# Patient Record
Sex: FEMALE | Race: WHITE | Employment: OTHER | URBAN - METROPOLITAN AREA
[De-identification: names, ages, dates, MRNs, and addresses within clinical notes are randomized per-mention and may not be internally consistent; named-entity substitution may affect disease eponyms.]

---

## 2018-03-02 PROBLEM — J44.9 COPD (CHRONIC OBSTRUCTIVE PULMONARY DISEASE) (HCC): Status: ACTIVE | Noted: 2018-03-02

## 2018-03-02 PROBLEM — J96.01 ACUTE HYPOXEMIC RESPIRATORY FAILURE (HCC): Status: ACTIVE | Noted: 2018-03-02

## 2018-03-02 PROBLEM — Z99.89 OSA ON CPAP: Status: ACTIVE | Noted: 2018-03-02

## 2018-03-02 PROBLEM — J44.1 COPD EXACERBATION (HCC): Status: ACTIVE | Noted: 2018-03-02

## 2018-03-02 PROBLEM — G47.33 OSA ON CPAP: Status: ACTIVE | Noted: 2018-03-02

## 2018-03-12 ENCOUNTER — TELEPHONE (OUTPATIENT)
Dept: PULMONOLOGY | Age: 53
End: 2018-03-12

## 2018-03-13 PROBLEM — J96.01 ACUTE HYPOXEMIC RESPIRATORY FAILURE (HCC): Status: RESOLVED | Noted: 2018-03-02 | Resolved: 2018-03-13

## 2018-03-15 ENCOUNTER — TELEPHONE (OUTPATIENT)
Dept: PULMONOLOGY | Age: 53
End: 2018-03-15

## 2018-03-15 DIAGNOSIS — R91.1 PULMONARY NODULE: Primary | ICD-10-CM

## 2018-03-15 NOTE — TELEPHONE ENCOUNTER
Ct scan scheduled for 9/24/18 at 10:30 AM arrival at 10 AM per request.  Patient missed appointment due to transportation issues. Patient is currently scheduled for follow up with  9/25/18. Attempted to reach patient to inform and no answer. Order is pending for Dr. Marissa Dumont to review and sign.

## 2018-03-15 NOTE — TELEPHONE ENCOUNTER
Addendum completed for review and patient has scheduled follow up today 3/15/18. ADDENDUM:   Prior study has become available for review, CT chest from [facility] dated 05/24/2013.  Most of the noncalcified basilar pulmonary   nodules appear similar to prior, though some are slightly increased in    size. For example, 4 mm left lower lobe nodule on series 2, image 25 had    measured   approximately 3 mm on prior.  Others are minimally less conspicuous, for   example a nodule measuring 5 mm on image 24 that had measured 6 mm on    prior.    Chest CT follow-up in 6 months time is suggested to ensure continued   stability of the nodules.       Signed by Nam Underwood MD on 03/15/18 9490

## 2018-03-19 ENCOUNTER — OFFICE VISIT (OUTPATIENT)
Dept: CARDIOLOGY CLINIC | Age: 53
End: 2018-03-19

## 2018-03-19 VITALS
WEIGHT: 293 LBS | SYSTOLIC BLOOD PRESSURE: 114 MMHG | BODY MASS INDEX: 47.09 KG/M2 | DIASTOLIC BLOOD PRESSURE: 82 MMHG | HEART RATE: 81 BPM | OXYGEN SATURATION: 96 % | HEIGHT: 66 IN

## 2018-03-19 DIAGNOSIS — I48.91 ATRIAL FIBRILLATION, UNSPECIFIED TYPE (HCC): Primary | ICD-10-CM

## 2018-03-19 PROCEDURE — G8427 DOCREV CUR MEDS BY ELIG CLIN: HCPCS | Performed by: INTERNAL MEDICINE

## 2018-03-19 PROCEDURE — 1111F DSCHRG MED/CURRENT MED MERGE: CPT | Performed by: INTERNAL MEDICINE

## 2018-03-19 PROCEDURE — G8482 FLU IMMUNIZE ORDER/ADMIN: HCPCS | Performed by: INTERNAL MEDICINE

## 2018-03-19 PROCEDURE — 3017F COLORECTAL CA SCREEN DOC REV: CPT | Performed by: INTERNAL MEDICINE

## 2018-03-19 PROCEDURE — 99214 OFFICE O/P EST MOD 30 MIN: CPT | Performed by: INTERNAL MEDICINE

## 2018-03-19 PROCEDURE — 93000 ELECTROCARDIOGRAM COMPLETE: CPT | Performed by: INTERNAL MEDICINE

## 2018-03-19 PROCEDURE — 4004F PT TOBACCO SCREEN RCVD TLK: CPT | Performed by: INTERNAL MEDICINE

## 2018-03-19 PROCEDURE — G8417 CALC BMI ABV UP PARAM F/U: HCPCS | Performed by: INTERNAL MEDICINE

## 2018-03-19 RX ORDER — AMIODARONE HYDROCHLORIDE 200 MG/1
TABLET ORAL
Qty: 30 TABLET | Refills: 3 | Status: SHIPPED | OUTPATIENT
Start: 2018-03-19 | End: 2018-08-07 | Stop reason: SDUPTHER

## 2018-08-07 RX ORDER — AMIODARONE HYDROCHLORIDE 200 MG/1
TABLET ORAL
Qty: 30 TABLET | Refills: 3 | Status: SHIPPED | OUTPATIENT
Start: 2018-08-07 | End: 2018-12-10 | Stop reason: SDUPTHER

## 2018-09-18 LAB — INR BLD: 2.1

## 2018-09-26 ENCOUNTER — OFFICE VISIT (OUTPATIENT)
Dept: CARDIOLOGY CLINIC | Age: 53
End: 2018-09-26
Payer: MEDICARE

## 2018-09-26 VITALS
WEIGHT: 262 LBS | HEART RATE: 62 BPM | BODY MASS INDEX: 42.11 KG/M2 | HEIGHT: 66 IN | SYSTOLIC BLOOD PRESSURE: 114 MMHG | DIASTOLIC BLOOD PRESSURE: 68 MMHG

## 2018-09-26 DIAGNOSIS — I47.1 ATRIAL TACHYCARDIA (HCC): ICD-10-CM

## 2018-09-26 DIAGNOSIS — Z72.0 TOBACCO ABUSE: ICD-10-CM

## 2018-09-26 DIAGNOSIS — I48.0 PAROXYSMAL ATRIAL FIBRILLATION (HCC): Primary | ICD-10-CM

## 2018-09-26 DIAGNOSIS — I10 ESSENTIAL HYPERTENSION: ICD-10-CM

## 2018-09-26 PROCEDURE — 93000 ELECTROCARDIOGRAM COMPLETE: CPT | Performed by: NURSE PRACTITIONER

## 2018-09-26 PROCEDURE — 4004F PT TOBACCO SCREEN RCVD TLK: CPT | Performed by: NURSE PRACTITIONER

## 2018-09-26 PROCEDURE — 99214 OFFICE O/P EST MOD 30 MIN: CPT | Performed by: NURSE PRACTITIONER

## 2018-09-26 PROCEDURE — G8427 DOCREV CUR MEDS BY ELIG CLIN: HCPCS | Performed by: NURSE PRACTITIONER

## 2018-09-26 PROCEDURE — 3017F COLORECTAL CA SCREEN DOC REV: CPT | Performed by: NURSE PRACTITIONER

## 2018-09-26 PROCEDURE — G8417 CALC BMI ABV UP PARAM F/U: HCPCS | Performed by: NURSE PRACTITIONER

## 2018-09-26 RX ORDER — DULOXETIN HYDROCHLORIDE 60 MG/1
120 CAPSULE, DELAYED RELEASE ORAL
COMMUNITY
Start: 2018-09-25

## 2018-09-26 RX ORDER — OXYCODONE HYDROCHLORIDE 5 MG/1
10 TABLET ORAL 2 TIMES DAILY
COMMUNITY

## 2018-09-26 NOTE — PROGRESS NOTES
MD   torsemide (DEMADEX) 20 MG tablet Take 20 mg by mouth daily   Yes Historical Provider, MD   baclofen (LIORESAL) 10 MG tablet Take 10 mg by mouth 3 times daily   Yes Historical Provider, MD   Calcium Carb-Cholecalciferol (CALCIUM-VITAMIN D) 500-200 MG-UNIT per tablet Take 1 tablet by mouth 2 times daily (with meals)   Yes Historical Provider, MD   clonazePAM (KLONOPIN) 1 MG tablet Take 1 mg by mouth 2 times daily as needed. Yes Historical Provider, MD   diclofenac sodium 1 % GEL Apply 2 g topically 2 times daily   Yes Historical Provider, MD   digoxin (LANOXIN) 125 MCG tablet Take 125 mcg by mouth daily   Yes Historical Provider, MD   docusate sodium (COLACE) 100 MG capsule Take 100 mg by mouth 2 times daily   Yes Historical Provider, MD   fluticasone (FLOVENT HFA) 220 MCG/ACT inhaler Inhale 1 puff into the lungs 2 times daily   Yes Historical Provider, MD   gabapentin (NEURONTIN) 400 MG capsule Take 800 mg by mouth 3 times daily. Yes Historical Provider, MD   Magnesium 400 MG CAPS Take by mouth 2 times daily   Yes Historical Provider, MD   methimazole (TAPAZOLE) 5 MG tablet Take 2.5 mg by mouth nightly 2.5 mg, cuts the 5 mg in half   Yes Historical Provider, MD   nicotine (NICODERM CQ) 7 MG/24HR Place 1 patch onto the skin every 24 hours   Yes Historical Provider, MD   potassium chloride (KLOR-CON) 10 MEQ extended release tablet Take 40 mEq by mouth daily    Yes Historical Provider, MD   acetaminophen (TYLENOL) 325 MG tablet Take 650 mg by mouth every 6 hours as needed for Pain   Yes Historical Provider, MD   warfarin (COUMADIN) 7.5 MG tablet Take 7.5 mg by mouth   Yes Historical Provider, MD   vitamin D (ERGOCALCIFEROL) 72772 units CAPS capsule Take 50,000 Units by mouth once a week   Yes Historical Provider, MD   albuterol (PROVENTIL HFA;VENTOLIN HFA) 108 (90 BASE) MCG/ACT inhaler Inhale 2 puffs into the lungs every 6 hours as needed.    Yes Historical Provider, MD       Allergies:  Statins and Morphine    Social History:   reports that she has been smoking Cigarettes. She has a 35.00 pack-year smoking history. She has never used smokeless tobacco. She reports that she does not drink alcohol. Family History: family history includes Heart Disease in her father and mother. Review of Systems   Constitutional: Negative. HENT: Negative. Eyes: Negative. Respiratory: + chronic SOB  Cardiovascular: see HPI  Gastrointestinal: Negative. Genitourinary: Negative. Musculoskeletal: + right hip and back pain; chronic right leg swelling; uses wheelchair  Skin: Negative. Neurological: Negative. Hematological: Negative. Psychiatric/Behavioral: Negative. PHYSICAL EXAM:    Physical Examination:    /68   Pulse 62   Ht 5' 6\" (1.676 m)   Wt 262 lb (118.8 kg)   BMI 42.29 kg/m²      Constitutional and general appearance: alert, cooperative, no distress and appears stated age  [de-identified]: PERRL, no cervical lymphadenopathy. No masses palpable. Normal oral mucosa  Respiratory:  · Normal excursion and expansion without use of accessory muscles  · Resp auscultation: Diminished breath sounds; no wheezing/rhonchi/rales  Cardiovascular:  · The apical impulse is not displaced  · Heart tones are crisp and normal. Regular S1 and S2.  · Jugular venous pulsation Normal  · The carotid upstroke is normal in amplitude and contour without delay or bruit  · Peripheral pulses are symmetrical and full   Abdomen:  · No masses or tenderness  · Bowel sounds present  Extremities:  ·  No cyanosis or clubbing  ·  Mild swelling of the right leg (chronic per patient)  ·  Skin: warm and dry  Neurological:  · Alert and oriented  · Moves all extremities well  · Anxious appearing    DATA:    ECG 9/26/2018:  SR HR 61    Echo 3/3/2018:  Technically difficult study.  Normal left ventricular size, wall thickness and wall motion with an estimated ejection fraction of 50-55 %.   Left atrium is moderately dilated.   Mild to moderate mitral regurgitation is present. CARDIOLOGY LABS:   CBC: No results for input(s): WBC, HGB, HCT, PLT in the last 72 hours. BMP: No results for input(s): NA, K, CO2, BUN, CREATININE, LABGLOM, GLUCOSE in the last 72 hours. PT/INR: No results for input(s): PROTIME, INR in the last 72 hours. APTT:No results for input(s): APTT in the last 72 hours. FASTING LIPID PANEL:No results found for: HDL, LDLDIRECT, LDLCALC, TRIG  LIVER PROFILE:No results for input(s): AST, ALT, ALB in the last 72 hours. Assessment:   1. Symptomatic paroxysmal (formerly persistent) atrial arrhythmias   -history of afib and AT   -s/p failed afib ablation in 2014 at [facility]   -intolerant of sotalol due to wheezing   -amiodarone started in 3/2018  2. HTN: controlled  3. History of NICM: EF improved to 40-45%  4. Mitral regurgitation: mild to moderate on echo in 3/2018  5. Chronic pain: followed by pain clinic at   6. Hyperthyroidism: on Tapazole  7. LOTTIE  8. COPD  9. Tobacco abuse    Plan:   1. Continue amiodarone, digoxin, and Coumadin  2. Refer to endocrinology due to hyperthyroidism and amiodarone use  3. Patient checks INR at home and is requesting for our office to manage. Will arrange. 4. Smoking cessation is recommended  5.  Follow up in 3 months     DEE Arias-CNP  [facility]  [phone]

## 2018-10-01 ENCOUNTER — TELEPHONE (OUTPATIENT)
Dept: PULMONOLOGY | Age: 53
End: 2018-10-01

## 2018-10-04 ENCOUNTER — TELEPHONE (OUTPATIENT)
Dept: CARDIOLOGY CLINIC | Age: 53
End: 2018-10-04

## 2018-10-08 ENCOUNTER — ANTI-COAG VISIT (OUTPATIENT)
Dept: CARDIOLOGY CLINIC | Age: 53
End: 2018-10-08

## 2018-10-08 NOTE — TELEPHONE ENCOUNTER
INR was 2.1 3 weeks ago. Pt takes 2.5 mg 5 days per week and 1.25 the other 2 days of the week. Please call to advise.

## 2018-10-10 ENCOUNTER — TELEPHONE (OUTPATIENT)
Dept: CARDIOLOGY CLINIC | Age: 53
End: 2018-10-10

## 2018-10-10 LAB — INR BLD: 4.1

## 2018-10-11 ENCOUNTER — ANTI-COAG VISIT (OUTPATIENT)
Dept: CARDIOLOGY CLINIC | Age: 53
End: 2018-10-11

## 2018-10-11 NOTE — TELEPHONE ENCOUNTER
LMOVM. INR is 4.1. Per protocol, dosing change is hold today. New regimen is 1.25 Mon/Thu/Sat and 2.5 mg all other days. Recheck 1 week(s).  Provider notified. ~ Janene Doherty RN

## 2018-10-22 ENCOUNTER — ANTI-COAG VISIT (OUTPATIENT)
Dept: CARDIOLOGY CLINIC | Age: 53
End: 2018-10-22

## 2018-10-22 ENCOUNTER — TELEPHONE (OUTPATIENT)
Dept: CARDIOLOGY CLINIC | Age: 53
End: 2018-10-22

## 2018-10-22 LAB — INR BLD: 2.5

## 2018-10-28 ENCOUNTER — HOSPITAL ENCOUNTER (OUTPATIENT)
Dept: CT IMAGING | Age: 53
Discharge: HOME | End: 2018-10-28
Payer: MEDICARE

## 2018-10-28 DIAGNOSIS — R91.1 PULMONARY NODULE: ICD-10-CM

## 2018-10-28 PROCEDURE — 71250 CT THORAX DX C-: CPT

## 2018-10-29 ENCOUNTER — TELEPHONE (OUTPATIENT)
Dept: CARDIOLOGY CLINIC | Age: 53
End: 2018-10-29

## 2018-10-29 LAB — INR BLD: 2.5

## 2018-10-31 ENCOUNTER — ANTI-COAG VISIT (OUTPATIENT)
Dept: CARDIOLOGY CLINIC | Age: 53
End: 2018-10-31

## 2018-10-31 ENCOUNTER — OFFICE VISIT (OUTPATIENT)
Dept: PULMONOLOGY | Age: 53
End: 2018-10-31
Payer: MEDICARE

## 2018-10-31 ENCOUNTER — TELEPHONE (OUTPATIENT)
Dept: PULMONOLOGY | Age: 53
End: 2018-10-31

## 2018-10-31 VITALS
HEIGHT: 67 IN | WEIGHT: 248 LBS | BODY MASS INDEX: 38.92 KG/M2 | OXYGEN SATURATION: 94 % | SYSTOLIC BLOOD PRESSURE: 122 MMHG | TEMPERATURE: 98.5 F | DIASTOLIC BLOOD PRESSURE: 79 MMHG | RESPIRATION RATE: 20 BRPM | HEART RATE: 122 BPM

## 2018-10-31 DIAGNOSIS — R91.1 PULMONARY NODULE: ICD-10-CM

## 2018-10-31 DIAGNOSIS — E04.1 THYROID NODULE: Primary | ICD-10-CM

## 2018-10-31 DIAGNOSIS — R59.0 MEDIASTINAL LYMPHADENOPATHY: ICD-10-CM

## 2018-10-31 DIAGNOSIS — R91.1 PULMONARY NODULE: Primary | ICD-10-CM

## 2018-10-31 DIAGNOSIS — D49.0 SALIVARY GLAND TUMOR: ICD-10-CM

## 2018-10-31 PROCEDURE — G8484 FLU IMMUNIZE NO ADMIN: HCPCS | Performed by: INTERNAL MEDICINE

## 2018-10-31 PROCEDURE — 99214 OFFICE O/P EST MOD 30 MIN: CPT | Performed by: INTERNAL MEDICINE

## 2018-10-31 PROCEDURE — G8417 CALC BMI ABV UP PARAM F/U: HCPCS | Performed by: INTERNAL MEDICINE

## 2018-10-31 PROCEDURE — 3017F COLORECTAL CA SCREEN DOC REV: CPT | Performed by: INTERNAL MEDICINE

## 2018-10-31 PROCEDURE — G8427 DOCREV CUR MEDS BY ELIG CLIN: HCPCS | Performed by: INTERNAL MEDICINE

## 2018-10-31 PROCEDURE — 4004F PT TOBACCO SCREEN RCVD TLK: CPT | Performed by: INTERNAL MEDICINE

## 2018-10-31 RX ORDER — FLUTICASONE FUROATE AND VILANTEROL 200; 25 UG/1; UG/1
1 POWDER RESPIRATORY (INHALATION) DAILY
Qty: 1 EACH | Refills: 5 | Status: SHIPPED | OUTPATIENT
Start: 2018-10-31 | End: 2019-04-02 | Stop reason: SDUPTHER

## 2018-10-31 NOTE — PROGRESS NOTES
involve the thoracic spine.           Impression   1. Multiple solid bilateral pulmonary nodules.  The differential diagnosis   favors metastatic disease rather than an infectious/inflammatory process. Recommend PET-CT for further evaluation. 2. Mildly enlarged mediastinal adenopathy. 3. 2.6 cm left thyroid nodule.  Recommend nonemergent thyroid ultrasound   further evaluation.      Assessment:  · Pulmonary Nodules, remote h/o Pulmonary Nodules at [facility] but size listed as 6 mm and smaller, no Mediastinal lymphadenopathy   · Mediastinal lymphadenopathy   · Left thyroid nodule  · Asthma, moderate persistent  · Recent hospitalization with sotalol causing increased wheezing  · LOTTIE on home CPAP  · Pulmonary hypertension seen in past by Dr. Cindy Hogan, felt to be 2/2 CHF  · Morbid obesity  · Atrial fibrillation     Plan:   · CT PET  · Ultrasound evaluation of left thyroid nodule  · Breo 200, PRN albuterol  · Tobacco cessation

## 2018-10-31 NOTE — TELEPHONE ENCOUNTER
Patient is scheduled for a PET/CT 11/6/18 at 12 PM [facility]. Please look for results.

## 2018-11-02 ENCOUNTER — HOSPITAL ENCOUNTER (OUTPATIENT)
Dept: ULTRASOUND IMAGING | Age: 53
Discharge: HOME | End: 2018-11-02
Payer: MEDICARE

## 2018-11-02 DIAGNOSIS — E04.1 THYROID NODULE: ICD-10-CM

## 2018-11-02 PROCEDURE — 76536 US EXAM OF HEAD AND NECK: CPT

## 2018-11-05 LAB — INR BLD: 2.4

## 2018-11-06 ENCOUNTER — ANTI-COAG VISIT (OUTPATIENT)
Dept: CARDIOLOGY CLINIC | Age: 53
End: 2018-11-06

## 2018-11-06 ENCOUNTER — TELEPHONE (OUTPATIENT)
Dept: CARDIOLOGY CLINIC | Age: 53
End: 2018-11-06

## 2018-11-06 ENCOUNTER — HOSPITAL ENCOUNTER (OUTPATIENT)
Dept: PET IMAGING | Age: 53
Discharge: HOME | End: 2018-11-06
Payer: MEDICARE

## 2018-11-06 VITALS — WEIGHT: 248 LBS | BODY MASS INDEX: 38.92 KG/M2 | HEIGHT: 67 IN

## 2018-11-06 DIAGNOSIS — R91.1 PULMONARY NODULE: ICD-10-CM

## 2018-11-06 PROCEDURE — A9552 F18 FDG: HCPCS | Performed by: INTERNAL MEDICINE

## 2018-11-06 PROCEDURE — 3430000000 HC RX DIAGNOSTIC RADIOPHARMACEUTICAL: Performed by: INTERNAL MEDICINE

## 2018-11-06 PROCEDURE — 78815 PET IMAGE W/CT SKULL-THIGH: CPT

## 2018-11-06 RX ORDER — FLUDEOXYGLUCOSE F 18 200 MCI/ML
15.61 INJECTION, SOLUTION INTRAVENOUS
Status: COMPLETED | OUTPATIENT
Start: 2018-11-06 | End: 2018-11-06

## 2018-11-06 RX ADMIN — FLUDEOXYGLUCOSE F 18 15.61 MILLICURIE: 200 INJECTION, SOLUTION INTRAVENOUS at 12:00

## 2018-11-06 NOTE — PROGRESS NOTES
11/06/18: INR is 2.4. Per protocol, no changes. Continue 1.25 mg Mon/Thu/Sat and 2.5 mg all other days. Recheck 1 week(s).  Provider notified. ~ Luana Redding RN

## 2018-11-08 ENCOUNTER — OFFICE VISIT (OUTPATIENT)
Dept: PULMONOLOGY | Age: 53
End: 2018-11-08
Payer: MEDICARE

## 2018-11-08 VITALS
RESPIRATION RATE: 16 BRPM | DIASTOLIC BLOOD PRESSURE: 86 MMHG | BODY MASS INDEX: 39.71 KG/M2 | TEMPERATURE: 97.6 F | SYSTOLIC BLOOD PRESSURE: 130 MMHG | WEIGHT: 253 LBS | HEART RATE: 95 BPM | HEIGHT: 67 IN | OXYGEN SATURATION: 95 %

## 2018-11-08 DIAGNOSIS — R59.0 MEDIASTINAL LYMPHADENOPATHY: ICD-10-CM

## 2018-11-08 DIAGNOSIS — R91.1 PULMONARY NODULE: Primary | ICD-10-CM

## 2018-11-08 DIAGNOSIS — K11.8 PAROTID NODULE: ICD-10-CM

## 2018-11-08 PROCEDURE — G8417 CALC BMI ABV UP PARAM F/U: HCPCS | Performed by: INTERNAL MEDICINE

## 2018-11-08 PROCEDURE — G8484 FLU IMMUNIZE NO ADMIN: HCPCS | Performed by: INTERNAL MEDICINE

## 2018-11-08 PROCEDURE — 3017F COLORECTAL CA SCREEN DOC REV: CPT | Performed by: INTERNAL MEDICINE

## 2018-11-08 PROCEDURE — G8427 DOCREV CUR MEDS BY ELIG CLIN: HCPCS | Performed by: INTERNAL MEDICINE

## 2018-11-08 PROCEDURE — 4004F PT TOBACCO SCREEN RCVD TLK: CPT | Performed by: INTERNAL MEDICINE

## 2018-11-08 PROCEDURE — 99214 OFFICE O/P EST MOD 30 MIN: CPT | Performed by: INTERNAL MEDICINE

## 2018-11-08 ASSESSMENT — SLEEP AND FATIGUE QUESTIONNAIRES
HOW LIKELY ARE YOU TO NOD OFF OR FALL ASLEEP WHILE SITTING AND READING: 3
NECK CIRCUMFERENCE (INCHES): 16.75
HOW LIKELY ARE YOU TO NOD OFF OR FALL ASLEEP WHILE SITTING QUIETLY AFTER LUNCH WITHOUT ALCOHOL: 0
HOW LIKELY ARE YOU TO NOD OFF OR FALL ASLEEP WHILE WATCHING TV: 2
HOW LIKELY ARE YOU TO NOD OFF OR FALL ASLEEP WHEN YOU ARE A PASSENGER IN A CAR FOR AN HOUR WITHOUT A BREAK: 0
HOW LIKELY ARE YOU TO NOD OFF OR FALL ASLEEP WHILE LYING DOWN TO REST IN THE AFTERNOON WHEN CIRCUMSTANCES PERMIT: 3
HOW LIKELY ARE YOU TO NOD OFF OR FALL ASLEEP WHILE SITTING AND TALKING TO SOMEONE: 0
HOW LIKELY ARE YOU TO NOD OFF OR FALL ASLEEP WHILE SITTING INACTIVE IN A PUBLIC PLACE: 0
HOW LIKELY ARE YOU TO NOD OFF OR FALL ASLEEP IN A CAR, WHILE STOPPED FOR A FEW MINUTES IN TRAFFIC: 0
ESS TOTAL SCORE: 8

## 2018-11-08 NOTE — PATIENT INSTRUCTIONS
Bronchoscopy with endobronchial ultrasound (EBUS) guided biopsy to biopsy lymph nodes   HOLD coumadin after Sunday night for Friday procedure  ENT to evaluate the parotid gland    Tips to Help You Stop Smoking (taken from Up-To-Date)      Cigarette smoking is a preventable cause of death in the [place]. Quitting smoking now can decrease your risk of getting smoking-related illnesses like heart disease, stroke, cancer & COPD. S = Set a quit date. T = Tell family, friends, and the people around you that you plan to quit. A = Anticipate or plan ahead for the tough times you'll face while quitting. R = Remove cigarettes and other tobacco products from your home, car, and work. T = Talk to your doctor about getting help to quit. Your doctor may give you medicines to reduce your craving for cigarettes & the unpleasant symptoms that happen when you stop smoking (called withdrawal symptoms). What are the symptoms of withdrawal? -- The symptoms include:   ?Trouble sleeping   ? Being irritable, anxious or restless   ? Getting frustrated or angry   ? Having trouble thinking clearly  Nicotine replacement therapy eases withdrawal and reduces your bodys craving for nicotine, the main drug found in cigarettes. Non-prescription forms of nicotine replacement include skin patches, lozenges, and gum. Two prescription medications are available that have been proven to help people stop smoking:  ? Bupropion is a prescription medicine that reduces your desire to smoke. This medicine is sold under the brand names Zyban and Wellbutrin & as a generic formulation. ? Varenicline (brand name: Chantix) is a prescription medicine that reduces withdrawal symptoms and cigarette cravings. If you take bupropion or varenicline and you have any new or worsening depressed mood or thoughts of harming yourself or someone else, stop taking the medicine and call your doctor.   Buproprion should not be taken by anyone with a history of

## 2018-11-08 NOTE — TELEPHONE ENCOUNTER
Need to scheduled bronch for 11/16/18 at [address] [place] [address], will need to contact endoscopy tomorrow.     Assessment:OV 11/8/18  · Left parotid gland nodule, hypermetabolic   · Pulmonary Nodules, remote h/o Pulmonary Nodules at [facility] but size listed as 6 mm and smaller, no Mediastinal lymphadenopathy   · Mediastinal lymphadenopathy   · Left thyroid nodule  · Asthma, moderate persistent  · Recent hospitalization with sotalol causing increased wheezing  · LOTTIE on home CPAP  · Pulmonary hypertension seen in past by Dr. Freya Mejia, felt to be 2/2 CHF  · Morbid obesity  · Atrial fibrillation      Plan:   · Referral to ENT for L parotid nodule, hypermetabolic on CT PET  · Bronchoscopy with endobronchial ultrasound (EBUS) guided biopsy Friday am, STAT INR prior to procedure  · Breo 200 (give today), PRN albuterol  · Tobacco cessation

## 2018-11-09 RX ORDER — FLUTICASONE FUROATE AND VILANTEROL 200; 25 UG/1; UG/1
1 POWDER RESPIRATORY (INHALATION) DAILY
Qty: 1 EACH | Refills: 0 | COMMUNITY
Start: 2018-11-09 | End: 2018-11-16

## 2018-11-13 ENCOUNTER — OFFICE VISIT (OUTPATIENT)
Dept: ENT CLINIC | Age: 53
End: 2018-11-13
Payer: MEDICARE

## 2018-11-13 VITALS
DIASTOLIC BLOOD PRESSURE: 82 MMHG | SYSTOLIC BLOOD PRESSURE: 123 MMHG | HEART RATE: 110 BPM | TEMPERATURE: 98.7 F | BODY MASS INDEX: 39.63 KG/M2 | HEIGHT: 67 IN

## 2018-11-13 DIAGNOSIS — K11.8 PAROTID MASS: Primary | ICD-10-CM

## 2018-11-13 PROCEDURE — G8417 CALC BMI ABV UP PARAM F/U: HCPCS | Performed by: OTOLARYNGOLOGY

## 2018-11-13 PROCEDURE — 4004F PT TOBACCO SCREEN RCVD TLK: CPT | Performed by: OTOLARYNGOLOGY

## 2018-11-13 PROCEDURE — 99203 OFFICE O/P NEW LOW 30 MIN: CPT | Performed by: OTOLARYNGOLOGY

## 2018-11-13 PROCEDURE — 3017F COLORECTAL CA SCREEN DOC REV: CPT | Performed by: OTOLARYNGOLOGY

## 2018-11-13 PROCEDURE — G8427 DOCREV CUR MEDS BY ELIG CLIN: HCPCS | Performed by: OTOLARYNGOLOGY

## 2018-11-13 PROCEDURE — G8484 FLU IMMUNIZE NO ADMIN: HCPCS | Performed by: OTOLARYNGOLOGY

## 2018-11-15 ENCOUNTER — ANESTHESIA EVENT (OUTPATIENT)
Dept: ENDOSCOPY | Age: 53
End: 2018-11-15
Payer: MEDICARE

## 2018-11-15 ASSESSMENT — LIFESTYLE VARIABLES: SMOKING_STATUS: 1

## 2018-11-16 ENCOUNTER — HOSPITAL ENCOUNTER (OUTPATIENT)
Age: 53
Setting detail: OUTPATIENT SURGERY
Discharge: HOME | End: 2018-11-16
Attending: INTERNAL MEDICINE | Admitting: INTERNAL MEDICINE
Payer: MEDICARE

## 2018-11-16 ENCOUNTER — ANESTHESIA (OUTPATIENT)
Dept: ENDOSCOPY | Age: 53
End: 2018-11-16
Payer: MEDICARE

## 2018-11-16 VITALS
SYSTOLIC BLOOD PRESSURE: 118 MMHG | DIASTOLIC BLOOD PRESSURE: 81 MMHG | TEMPERATURE: 97.6 F | RESPIRATION RATE: 17 BRPM | OXYGEN SATURATION: 91 % | HEART RATE: 105 BPM

## 2018-11-16 VITALS
DIASTOLIC BLOOD PRESSURE: 84 MMHG | RESPIRATION RATE: 4 BRPM | OXYGEN SATURATION: 98 % | SYSTOLIC BLOOD PRESSURE: 113 MMHG

## 2018-11-16 LAB
APPEARANCE BAL (LAVAGE): ABNORMAL
CLOT EVALUATION BAL: ABNORMAL
COLOR LAVAGE: ABNORMAL
EOSIN: 1 %
INR BLD: 1.13 (ref 0.86–1.14)
LYMPHOCYTES, BAL: 12 % (ref 5–10)
MACROPHAGES, BAL: 70 % (ref 90–95)
NUMBER OF CELLS COUNTED BAL (LAVAGE): 200
PLATELET # BLD: 281 K/UL (ref 135–450)
PROTHROMBIN TIME: 12.9 SEC (ref 9.8–13)
RBC, BAL: ABNORMAL /CUMM
SEGMENTED NEUTROPHILS, BAL: 17 % (ref 5–10)
WBC/EPI CELLS BAL: 167 /CUMM

## 2018-11-16 PROCEDURE — 3700000001 HC ADD 15 MINUTES (ANESTHESIA): Performed by: INTERNAL MEDICINE

## 2018-11-16 PROCEDURE — 3609010800 HC BRONCHOSCOPY ALVEOLAR LAVAGE: Performed by: INTERNAL MEDICINE

## 2018-11-16 PROCEDURE — 3609011200 HC BRONCHOSCOPY W/TRANSBRONCL NDL ASPIR BX EA ADDL LOBE: Performed by: INTERNAL MEDICINE

## 2018-11-16 PROCEDURE — 36415 COLL VENOUS BLD VENIPUNCTURE: CPT

## 2018-11-16 PROCEDURE — 88112 CYTOPATH CELL ENHANCE TECH: CPT

## 2018-11-16 PROCEDURE — 88173 CYTOPATH EVAL FNA REPORT: CPT

## 2018-11-16 PROCEDURE — 87116 MYCOBACTERIA CULTURE: CPT

## 2018-11-16 PROCEDURE — 85049 AUTOMATED PLATELET COUNT: CPT

## 2018-11-16 PROCEDURE — 88185 FLOWCYTOMETRY/TC ADD-ON: CPT

## 2018-11-16 PROCEDURE — 87070 CULTURE OTHR SPECIMN AEROBIC: CPT

## 2018-11-16 PROCEDURE — 31653 BRONCH EBUS SAMPLNG 3/> NODE: CPT | Performed by: INTERNAL MEDICINE

## 2018-11-16 PROCEDURE — 3609011900 HC BRONCHOSCOPY NEEDLE BX TRACHEA MAIN STEM&/BRON: Performed by: INTERNAL MEDICINE

## 2018-11-16 PROCEDURE — 87102 FUNGUS ISOLATION CULTURE: CPT

## 2018-11-16 PROCEDURE — 31624 DX BRONCHOSCOPE/LAVAGE: CPT | Performed by: INTERNAL MEDICINE

## 2018-11-16 PROCEDURE — 88177 CYTP FNA EVAL EA ADDL: CPT

## 2018-11-16 PROCEDURE — 88305 TISSUE EXAM BY PATHOLOGIST: CPT

## 2018-11-16 PROCEDURE — 2709999900 HC NON-CHARGEABLE SUPPLY: Performed by: INTERNAL MEDICINE

## 2018-11-16 PROCEDURE — 2580000003 HC RX 258: Performed by: NURSE ANESTHETIST, CERTIFIED REGISTERED

## 2018-11-16 PROCEDURE — 87205 SMEAR GRAM STAIN: CPT

## 2018-11-16 PROCEDURE — 3700000000 HC ANESTHESIA ATTENDED CARE: Performed by: INTERNAL MEDICINE

## 2018-11-16 PROCEDURE — 88172 CYTP DX EVAL FNA 1ST EA SITE: CPT

## 2018-11-16 PROCEDURE — 87015 SPECIMEN INFECT AGNT CONCNTJ: CPT

## 2018-11-16 PROCEDURE — 88184 FLOWCYTOMETRY/ TC 1 MARKER: CPT

## 2018-11-16 PROCEDURE — 3603165200 HC BRNCHSC EBUS GUIDED SAMPL 1/2 NODE STATION/STRUX: Performed by: INTERNAL MEDICINE

## 2018-11-16 PROCEDURE — 6360000002 HC RX W HCPCS: Performed by: NURSE ANESTHETIST, CERTIFIED REGISTERED

## 2018-11-16 PROCEDURE — 89051 BODY FLUID CELL COUNT: CPT

## 2018-11-16 PROCEDURE — 2500000003 HC RX 250 WO HCPCS: Performed by: NURSE ANESTHETIST, CERTIFIED REGISTERED

## 2018-11-16 PROCEDURE — 87206 SMEAR FLUORESCENT/ACID STAI: CPT

## 2018-11-16 PROCEDURE — 7100000010 HC PHASE II RECOVERY - FIRST 15 MIN: Performed by: INTERNAL MEDICINE

## 2018-11-16 PROCEDURE — 85610 PROTHROMBIN TIME: CPT

## 2018-11-16 PROCEDURE — 7100000011 HC PHASE II RECOVERY - ADDTL 15 MIN: Performed by: INTERNAL MEDICINE

## 2018-11-16 RX ORDER — OXYCODONE HYDROCHLORIDE AND ACETAMINOPHEN 5; 325 MG/1; MG/1
1 TABLET ORAL PRN
Status: DISCONTINUED | OUTPATIENT
Start: 2018-11-16 | End: 2018-11-16 | Stop reason: HOSPADM

## 2018-11-16 RX ORDER — ONDANSETRON 2 MG/ML
INJECTION INTRAMUSCULAR; INTRAVENOUS PRN
Status: DISCONTINUED | OUTPATIENT
Start: 2018-11-16 | End: 2018-11-16 | Stop reason: SDUPTHER

## 2018-11-16 RX ORDER — LABETALOL HYDROCHLORIDE 5 MG/ML
5 INJECTION, SOLUTION INTRAVENOUS EVERY 10 MIN PRN
Status: DISCONTINUED | OUTPATIENT
Start: 2018-11-16 | End: 2018-11-16 | Stop reason: HOSPADM

## 2018-11-16 RX ORDER — ROCURONIUM BROMIDE 10 MG/ML
INJECTION, SOLUTION INTRAVENOUS PRN
Status: DISCONTINUED | OUTPATIENT
Start: 2018-11-16 | End: 2018-11-16 | Stop reason: SDUPTHER

## 2018-11-16 RX ORDER — HYDROMORPHONE HCL 110MG/55ML
0.25 PATIENT CONTROLLED ANALGESIA SYRINGE INTRAVENOUS EVERY 5 MIN PRN
Status: DISCONTINUED | OUTPATIENT
Start: 2018-11-16 | End: 2018-11-16 | Stop reason: HOSPADM

## 2018-11-16 RX ORDER — HYDRALAZINE HYDROCHLORIDE 20 MG/ML
5 INJECTION INTRAMUSCULAR; INTRAVENOUS EVERY 10 MIN PRN
Status: DISCONTINUED | OUTPATIENT
Start: 2018-11-16 | End: 2018-11-16 | Stop reason: HOSPADM

## 2018-11-16 RX ORDER — OXYCODONE HYDROCHLORIDE AND ACETAMINOPHEN 5; 325 MG/1; MG/1
2 TABLET ORAL PRN
Status: DISCONTINUED | OUTPATIENT
Start: 2018-11-16 | End: 2018-11-16 | Stop reason: HOSPADM

## 2018-11-16 RX ORDER — HYDROMORPHONE HCL 110MG/55ML
0.5 PATIENT CONTROLLED ANALGESIA SYRINGE INTRAVENOUS EVERY 5 MIN PRN
Status: DISCONTINUED | OUTPATIENT
Start: 2018-11-16 | End: 2018-11-16 | Stop reason: HOSPADM

## 2018-11-16 RX ORDER — FENTANYL CITRATE 50 UG/ML
INJECTION, SOLUTION INTRAMUSCULAR; INTRAVENOUS PRN
Status: DISCONTINUED | OUTPATIENT
Start: 2018-11-16 | End: 2018-11-16 | Stop reason: SDUPTHER

## 2018-11-16 RX ORDER — DEXAMETHASONE SODIUM PHOSPHATE 4 MG/ML
INJECTION, SOLUTION INTRA-ARTICULAR; INTRALESIONAL; INTRAMUSCULAR; INTRAVENOUS; SOFT TISSUE PRN
Status: DISCONTINUED | OUTPATIENT
Start: 2018-11-16 | End: 2018-11-16 | Stop reason: SDUPTHER

## 2018-11-16 RX ORDER — ONDANSETRON 2 MG/ML
4 INJECTION INTRAMUSCULAR; INTRAVENOUS EVERY 10 MIN PRN
Status: DISCONTINUED | OUTPATIENT
Start: 2018-11-16 | End: 2018-11-16 | Stop reason: HOSPADM

## 2018-11-16 RX ORDER — MEPERIDINE HYDROCHLORIDE 25 MG/ML
12.5 INJECTION INTRAMUSCULAR; INTRAVENOUS; SUBCUTANEOUS EVERY 5 MIN PRN
Status: DISCONTINUED | OUTPATIENT
Start: 2018-11-16 | End: 2018-11-16 | Stop reason: HOSPADM

## 2018-11-16 RX ORDER — MIDAZOLAM HYDROCHLORIDE 1 MG/ML
INJECTION INTRAMUSCULAR; INTRAVENOUS PRN
Status: DISCONTINUED | OUTPATIENT
Start: 2018-11-16 | End: 2018-11-16 | Stop reason: SDUPTHER

## 2018-11-16 RX ORDER — KETOROLAC TROMETHAMINE 30 MG/ML
INJECTION, SOLUTION INTRAMUSCULAR; INTRAVENOUS PRN
Status: DISCONTINUED | OUTPATIENT
Start: 2018-11-16 | End: 2018-11-16 | Stop reason: SDUPTHER

## 2018-11-16 RX ORDER — PROPOFOL 10 MG/ML
INJECTION, EMULSION INTRAVENOUS PRN
Status: DISCONTINUED | OUTPATIENT
Start: 2018-11-16 | End: 2018-11-16 | Stop reason: SDUPTHER

## 2018-11-16 RX ORDER — SODIUM CHLORIDE, SODIUM LACTATE, POTASSIUM CHLORIDE, CALCIUM CHLORIDE 600; 310; 30; 20 MG/100ML; MG/100ML; MG/100ML; MG/100ML
INJECTION, SOLUTION INTRAVENOUS CONTINUOUS PRN
Status: DISCONTINUED | OUTPATIENT
Start: 2018-11-16 | End: 2018-11-16 | Stop reason: SDUPTHER

## 2018-11-16 RX ORDER — LIDOCAINE HYDROCHLORIDE 20 MG/ML
INJECTION, SOLUTION INFILTRATION; PERINEURAL PRN
Status: DISCONTINUED | OUTPATIENT
Start: 2018-11-16 | End: 2018-11-16 | Stop reason: SDUPTHER

## 2018-11-16 RX ADMIN — DEXAMETHASONE SODIUM PHOSPHATE 10 MG: 4 INJECTION, SOLUTION INTRAMUSCULAR; INTRAVENOUS at 07:25

## 2018-11-16 RX ADMIN — KETOROLAC TROMETHAMINE 30 MG: 30 INJECTION, SOLUTION INTRAMUSCULAR; INTRAVENOUS at 08:25

## 2018-11-16 RX ADMIN — ROCURONIUM BROMIDE 10 MG: 10 INJECTION, SOLUTION INTRAVENOUS at 07:33

## 2018-11-16 RX ADMIN — SUGAMMADEX 200 MG: 100 INJECTION, SOLUTION INTRAVENOUS at 08:27

## 2018-11-16 RX ADMIN — ROCURONIUM BROMIDE 10 MG: 10 INJECTION, SOLUTION INTRAVENOUS at 07:56

## 2018-11-16 RX ADMIN — ONDANSETRON 4 MG: 2 INJECTION, SOLUTION INTRAMUSCULAR; INTRAVENOUS at 07:30

## 2018-11-16 RX ADMIN — LIDOCAINE HYDROCHLORIDE 50 MG: 20 INJECTION, SOLUTION INFILTRATION; PERINEURAL at 07:19

## 2018-11-16 RX ADMIN — MIDAZOLAM 2 MG: 1 INJECTION INTRAMUSCULAR; INTRAVENOUS at 07:15

## 2018-11-16 RX ADMIN — FENTANYL CITRATE 50 MCG: 50 INJECTION INTRAMUSCULAR; INTRAVENOUS at 07:19

## 2018-11-16 RX ADMIN — PROPOFOL 200 MG: 10 INJECTION, EMULSION INTRAVENOUS at 07:19

## 2018-11-16 RX ADMIN — ROCURONIUM BROMIDE 50 MG: 10 INJECTION, SOLUTION INTRAVENOUS at 07:15

## 2018-11-16 RX ADMIN — SODIUM CHLORIDE, POTASSIUM CHLORIDE, SODIUM LACTATE AND CALCIUM CHLORIDE: 600; 310; 30; 20 INJECTION, SOLUTION INTRAVENOUS at 07:15

## 2018-11-16 RX ADMIN — FENTANYL CITRATE 50 MCG: 50 INJECTION INTRAMUSCULAR; INTRAVENOUS at 07:15

## 2018-11-16 ASSESSMENT — PULMONARY FUNCTION TESTS
PIF_VALUE: 12
PIF_VALUE: 30
PIF_VALUE: 35
PIF_VALUE: 35
PIF_VALUE: 28
PIF_VALUE: 34
PIF_VALUE: 35
PIF_VALUE: 28
PIF_VALUE: 35
PIF_VALUE: 1
PIF_VALUE: 35
PIF_VALUE: 1
PIF_VALUE: 35
PIF_VALUE: 29
PIF_VALUE: 33
PIF_VALUE: 35
PIF_VALUE: 35
PIF_VALUE: 21
PIF_VALUE: 29
PIF_VALUE: 35
PIF_VALUE: 35
PIF_VALUE: 33
PIF_VALUE: 35
PIF_VALUE: 4
PIF_VALUE: 8
PIF_VALUE: 29
PIF_VALUE: 1
PIF_VALUE: 35
PIF_VALUE: 1
PIF_VALUE: 35
PIF_VALUE: 2
PIF_VALUE: 35
PIF_VALUE: 29
PIF_VALUE: 11
PIF_VALUE: 35
PIF_VALUE: 35
PIF_VALUE: 5
PIF_VALUE: 1
PIF_VALUE: 10
PIF_VALUE: 35
PIF_VALUE: 4
PIF_VALUE: 2
PIF_VALUE: 28
PIF_VALUE: 35
PIF_VALUE: 8
PIF_VALUE: 1
PIF_VALUE: 35
PIF_VALUE: 12
PIF_VALUE: 1
PIF_VALUE: 1
PIF_VALUE: 28
PIF_VALUE: 24
PIF_VALUE: 35
PIF_VALUE: 1
PIF_VALUE: 35
PIF_VALUE: 36
PIF_VALUE: 16
PIF_VALUE: 35
PIF_VALUE: 28
PIF_VALUE: 35
PIF_VALUE: 35

## 2018-11-16 ASSESSMENT — PAIN DESCRIPTION - ORIENTATION: ORIENTATION: LOWER

## 2018-11-16 ASSESSMENT — PAIN DESCRIPTION - PAIN TYPE: TYPE: CHRONIC PAIN

## 2018-11-16 ASSESSMENT — PAIN SCALES - GENERAL
PAINLEVEL_OUTOF10: 0
PAINLEVEL_OUTOF10: 0
PAINLEVEL_OUTOF10: 4
PAINLEVEL_OUTOF10: 0

## 2018-11-16 ASSESSMENT — PAIN DESCRIPTION - LOCATION: LOCATION: BACK

## 2018-11-16 NOTE — ANESTHESIA POSTPROCEDURE EVALUATION
Department of Anesthesiology  Postprocedure Note    Patient: Lg Hope  MRN: [id number]  YOB: 1965  Date of evaluation: 11/16/2018  Time:  12:16 PM     Procedure Summary     Date:  11/16/18 Room / Location:  [facility] ENDO 03 / [facility]    Anesthesia Start:  0715 Anesthesia Stop:  8563    Procedure:  BRONCHOSCOPY ULTRASOUND WF W/ANES. DRAW STAT INR (N/A ) Diagnosis:  (PULMONARY NODULE)    Surgeon:  Braeden Mckeon MD Responsible Provider:  Jose Wren MD    Anesthesia Type:  general ASA Status:  3          Anesthesia Type: general    Chrissy Phase I: Chrissy Score: 10    Chrissy Phase II: Chrissy Score: 10    Last vitals: Reviewed and per EMR flowsheets.        Anesthesia Post Evaluation    Patient location during evaluation: PACU  Level of consciousness: awake  Airway patency: patent  Nausea & Vomiting: no nausea  Complications: no  Cardiovascular status: blood pressure returned to baseline  Respiratory status: acceptable  Hydration status: euvolemic

## 2018-11-16 NOTE — H&P
MD        meperidine (DEMEROL) injection 12.5 mg  12.5 mg Intravenous Q5 Min PRN Milka Morris MD            reports that she has been smoking Cigarettes. She has a 35.00 pack-year smoking history. She has never used smokeless tobacco.    family history includes Heart Disease in her father and mother. HENT: Airway patent and reviewed. Mallampati 2  Cardiovascular: Normal rate, regular rhythm, normal heart sounds. Pulmonary/Chest: No increased work of breathing. CTA bilaterally  Abdominal: Soft. No distension. ASA CLASS       III. Severe Systemic Disease      Sedation plan: Anesthesia      Post Procedure Plan   Return to same level of care   ______________________    The risks and benefits of fiberoptic bronchoscopy were specifically discussed, including the goal of obtaining a diagnosis, the risks of bleeding, infection, pneumothorax, lung collapse, hospitalization and death. We also discussed the risks of sedation/anesthesia. It is my assessment that the risk of the invasive procedure is outweighed by the benefit. Patient was counseled regarding the recommended procedure, alternatives to the procedure, and possible consequences of not having any evaluation performed.

## 2018-11-18 LAB
CULTURE, RESPIRATORY: NORMAL
CULTURE, RESPIRATORY: NORMAL
GRAM STAIN RESULT: NORMAL
GRAM STAIN RESULT: NORMAL

## 2018-11-26 ENCOUNTER — ANTI-COAG VISIT (OUTPATIENT)
Dept: CARDIOLOGY CLINIC | Age: 53
End: 2018-11-26

## 2018-11-26 DIAGNOSIS — I48.0 PAROXYSMAL ATRIAL FIBRILLATION (HCC): Primary | ICD-10-CM

## 2018-11-26 LAB — INR BLD: 1.4

## 2018-11-26 NOTE — PROGRESS NOTES
11/26/18: INR is 1.4. Per protocol, dosing change is 2.5 today only then resume normal dosing of 1.25 mg Mon/Thu/Sat. Recheck 1 week(s). Provider notified. ~ Art Musa RN. Pt states she had surgery a week ago and had to hold warfarin for 4 days.

## 2018-11-30 NOTE — TELEPHONE ENCOUNTER
MD Jillian Pascal, [address] [facility]             Tell pt CT will include salivary glands.      Change CT to CT CHEST WITH IV DYE, evaluate Mediastinal lymphadenopathy   AND CT head and neck, with IV dye, evaluate salivary gland tumor     Check creatinine prior and proceed if less than 1.6

## 2018-12-03 ENCOUNTER — ANTI-COAG VISIT (OUTPATIENT)
Dept: CARDIOLOGY CLINIC | Age: 53
End: 2018-12-03

## 2018-12-03 ENCOUNTER — TELEPHONE (OUTPATIENT)
Dept: CARDIOLOGY CLINIC | Age: 53
End: 2018-12-03

## 2018-12-03 LAB — INR BLD: 2.3

## 2018-12-03 NOTE — PROGRESS NOTES
12/03/18: INR is 2.3. Per protocol, no changes. Continue 1.25 mg Mon. Thu/ Sat and 2.5 mg all other days. Recheck 1 week(s).  Provider notified. ~ Diana George RN

## 2018-12-10 ENCOUNTER — ANTI-COAG VISIT (OUTPATIENT)
Dept: CARDIOLOGY CLINIC | Age: 53
End: 2018-12-10

## 2018-12-10 ENCOUNTER — TELEPHONE (OUTPATIENT)
Dept: CARDIOLOGY CLINIC | Age: 53
End: 2018-12-10

## 2018-12-10 DIAGNOSIS — I47.1 ATRIAL TACHYCARDIA (HCC): Primary | ICD-10-CM

## 2018-12-10 LAB — INR BLD: 2.3

## 2018-12-11 RX ORDER — AMIODARONE HYDROCHLORIDE 200 MG/1
TABLET ORAL
Qty: 30 TABLET | Refills: 0 | Status: SHIPPED | OUTPATIENT
Start: 2018-12-11 | End: 2019-01-03 | Stop reason: SDUPTHER

## 2018-12-17 LAB
FUNGUS (MYCOLOGY) CULTURE: NORMAL
FUNGUS (MYCOLOGY) CULTURE: NORMAL
FUNGUS STAIN: NORMAL
FUNGUS STAIN: NORMAL
INR BLD: 2

## 2018-12-19 ENCOUNTER — ANTI-COAG VISIT (OUTPATIENT)
Dept: CARDIOLOGY CLINIC | Age: 53
End: 2018-12-19

## 2018-12-19 ENCOUNTER — TELEPHONE (OUTPATIENT)
Dept: CARDIOLOGY CLINIC | Age: 53
End: 2018-12-19

## 2018-12-24 LAB — INR BLD: 1.8

## 2018-12-26 ENCOUNTER — ANTI-COAG VISIT (OUTPATIENT)
Dept: CARDIOLOGY CLINIC | Age: 53
End: 2018-12-26

## 2018-12-26 ENCOUNTER — TELEPHONE (OUTPATIENT)
Dept: CARDIOLOGY CLINIC | Age: 53
End: 2018-12-26

## 2018-12-28 ENCOUNTER — HOSPITAL ENCOUNTER (OUTPATIENT)
Age: 53
Discharge: HOME | End: 2018-12-28
Payer: MEDICARE

## 2018-12-28 DIAGNOSIS — I48.0 PAROXYSMAL ATRIAL FIBRILLATION (HCC): ICD-10-CM

## 2018-12-28 LAB
ALBUMIN SERPL-MCNC: 3.9 G/DL (ref 3.4–5)
ALP BLD-CCNC: 134 U/L (ref 40–129)
ALT SERPL-CCNC: <5 U/L (ref 10–40)
ANION GAP SERPL CALCULATED.3IONS-SCNC: 13 MMOL/L (ref 3–16)
AST SERPL-CCNC: 12 U/L (ref 15–37)
BILIRUB SERPL-MCNC: 0.3 MG/DL (ref 0–1)
BILIRUBIN DIRECT: <0.2 MG/DL (ref 0–0.3)
BILIRUBIN, INDIRECT: ABNORMAL MG/DL (ref 0–1)
BUN BLDV-MCNC: 7 MG/DL (ref 7–20)
CALCIUM SERPL-MCNC: 8.8 MG/DL (ref 8.3–10.6)
CHLORIDE BLD-SCNC: 100 MMOL/L (ref 99–110)
CO2: 26 MMOL/L (ref 21–32)
CREAT SERPL-MCNC: 0.9 MG/DL (ref 0.6–1.1)
GFR AFRICAN AMERICAN: >60
GFR NON-AFRICAN AMERICAN: >60
GLUCOSE BLD-MCNC: 89 MG/DL (ref 70–99)
POTASSIUM SERPL-SCNC: 4.4 MMOL/L (ref 3.5–5.1)
SODIUM BLD-SCNC: 139 MMOL/L (ref 136–145)
TOTAL PROTEIN: 7.3 G/DL (ref 6.4–8.2)
TSH REFLEX: 2.57 UIU/ML (ref 0.27–4.2)

## 2018-12-28 PROCEDURE — 80048 BASIC METABOLIC PNL TOTAL CA: CPT

## 2018-12-28 PROCEDURE — 36415 COLL VENOUS BLD VENIPUNCTURE: CPT

## 2018-12-28 PROCEDURE — 80076 HEPATIC FUNCTION PANEL: CPT

## 2018-12-28 PROCEDURE — 84443 ASSAY THYROID STIM HORMONE: CPT

## 2019-01-01 LAB
AFB CULTURE (MYCOBACTERIA): NORMAL
AFB CULTURE (MYCOBACTERIA): NORMAL
AFB SMEAR: NORMAL
AFB SMEAR: NORMAL

## 2019-01-03 DIAGNOSIS — I47.1 ATRIAL TACHYCARDIA (HCC): ICD-10-CM

## 2019-01-03 RX ORDER — AMIODARONE HYDROCHLORIDE 200 MG/1
TABLET ORAL
Qty: 90 TABLET | Refills: 1 | Status: SHIPPED | OUTPATIENT
Start: 2019-01-03 | End: 2019-06-14 | Stop reason: SDUPTHER

## 2019-01-08 ENCOUNTER — ANTI-COAG VISIT (OUTPATIENT)
Dept: CARDIOLOGY CLINIC | Age: 54
End: 2019-01-08

## 2019-01-08 ENCOUNTER — TELEPHONE (OUTPATIENT)
Dept: CARDIOLOGY CLINIC | Age: 54
End: 2019-01-08

## 2019-01-08 LAB — INR BLD: 1.9

## 2019-01-14 ENCOUNTER — TELEPHONE (OUTPATIENT)
Dept: PULMONOLOGY | Age: 54
End: 2019-01-14

## 2019-01-14 LAB — INR BLD: 1.9

## 2019-01-15 ENCOUNTER — TELEPHONE (OUTPATIENT)
Dept: CARDIOLOGY CLINIC | Age: 54
End: 2019-01-15

## 2019-01-15 ENCOUNTER — ANTI-COAG VISIT (OUTPATIENT)
Dept: CARDIOLOGY CLINIC | Age: 54
End: 2019-01-15

## 2019-01-21 ENCOUNTER — ANTI-COAG VISIT (OUTPATIENT)
Dept: CARDIOLOGY CLINIC | Age: 54
End: 2019-01-21

## 2019-01-21 ENCOUNTER — TELEPHONE (OUTPATIENT)
Dept: CARDIOLOGY CLINIC | Age: 54
End: 2019-01-21

## 2019-01-21 LAB — INR BLD: 2.5

## 2019-01-22 ENCOUNTER — TELEPHONE (OUTPATIENT)
Dept: PULMONOLOGY | Age: 54
End: 2019-01-22

## 2019-01-25 ENCOUNTER — TELEPHONE (OUTPATIENT)
Dept: CARDIOLOGY CLINIC | Age: 54
End: 2019-01-25

## 2019-02-04 ENCOUNTER — TELEPHONE (OUTPATIENT)
Dept: CARDIOLOGY CLINIC | Age: 54
End: 2019-02-04

## 2019-02-04 ENCOUNTER — ANTI-COAG VISIT (OUTPATIENT)
Dept: CARDIOLOGY CLINIC | Age: 54
End: 2019-02-04

## 2019-02-04 LAB — INR BLD: 3.5

## 2019-02-11 LAB — INR BLD: 3.3

## 2019-02-13 ENCOUNTER — TELEPHONE (OUTPATIENT)
Dept: CARDIOLOGY CLINIC | Age: 54
End: 2019-02-13

## 2019-02-13 ENCOUNTER — ANTI-COAG VISIT (OUTPATIENT)
Dept: CARDIOLOGY CLINIC | Age: 54
End: 2019-02-13

## 2019-02-19 ENCOUNTER — ANTI-COAG VISIT (OUTPATIENT)
Dept: CARDIOLOGY CLINIC | Age: 54
End: 2019-02-19

## 2019-02-19 LAB — INR BLD: 2.3

## 2019-02-25 ENCOUNTER — TELEPHONE (OUTPATIENT)
Dept: CARDIOLOGY CLINIC | Age: 54
End: 2019-02-25

## 2019-02-25 ENCOUNTER — ANTI-COAG VISIT (OUTPATIENT)
Dept: CARDIOLOGY CLINIC | Age: 54
End: 2019-02-25

## 2019-02-25 LAB — INR BLD: 1.8

## 2019-03-04 ENCOUNTER — TELEPHONE (OUTPATIENT)
Dept: CARDIOLOGY CLINIC | Age: 54
End: 2019-03-04

## 2019-03-04 ENCOUNTER — ANTI-COAG VISIT (OUTPATIENT)
Dept: CARDIOLOGY CLINIC | Age: 54
End: 2019-03-04

## 2019-03-04 LAB — INR BLD: 2.9

## 2019-03-11 ENCOUNTER — ANTI-COAG VISIT (OUTPATIENT)
Dept: CARDIOLOGY CLINIC | Age: 54
End: 2019-03-11

## 2019-03-11 ENCOUNTER — TELEPHONE (OUTPATIENT)
Dept: CARDIOLOGY CLINIC | Age: 54
End: 2019-03-11

## 2019-03-11 LAB — INR BLD: 3.2

## 2019-03-15 ENCOUNTER — TELEPHONE (OUTPATIENT)
Dept: CARDIOLOGY CLINIC | Age: 54
End: 2019-03-15

## 2019-03-18 ENCOUNTER — TELEPHONE (OUTPATIENT)
Dept: CARDIOLOGY CLINIC | Age: 54
End: 2019-03-18

## 2019-03-18 LAB — INR BLD: 2.1

## 2019-03-19 ENCOUNTER — ANTI-COAG VISIT (OUTPATIENT)
Dept: CARDIOLOGY CLINIC | Age: 54
End: 2019-03-19

## 2019-03-27 LAB — INR BLD: 2.3

## 2019-03-29 ENCOUNTER — ANTI-COAG VISIT (OUTPATIENT)
Dept: CARDIOLOGY CLINIC | Age: 54
End: 2019-03-29

## 2019-04-03 LAB — INR BLD: 3.7

## 2019-04-04 ENCOUNTER — ANTI-COAG VISIT (OUTPATIENT)
Dept: CARDIOLOGY CLINIC | Age: 54
End: 2019-04-04

## 2019-04-04 ENCOUNTER — TELEPHONE (OUTPATIENT)
Dept: CARDIOLOGY CLINIC | Age: 54
End: 2019-04-04

## 2019-04-04 NOTE — TELEPHONE ENCOUNTER
04/4/2019 INR 3.7 - Per protocol, Hold x1 day then 1.25mg Mon/Wed/Fri and 2.5mg all others   Provider notified ~ Mary Beth Clariocent RMA    Left message for patient to call the office.

## 2019-04-09 ENCOUNTER — TELEPHONE (OUTPATIENT)
Dept: GASTROENTEROLOGY | Age: 54
End: 2019-04-09

## 2019-04-10 ENCOUNTER — TELEPHONE (OUTPATIENT)
Dept: CARDIOLOGY CLINIC | Age: 54
End: 2019-04-10

## 2019-04-10 LAB — INR BLD: 4

## 2019-04-11 ENCOUNTER — ANTI-COAG VISIT (OUTPATIENT)
Dept: CARDIOLOGY CLINIC | Age: 54
End: 2019-04-11

## 2019-04-24 ENCOUNTER — ANTI-COAG VISIT (OUTPATIENT)
Dept: CARDIOLOGY CLINIC | Age: 54
End: 2019-04-24

## 2019-04-24 LAB — INR BLD: 1.4

## 2019-05-01 ENCOUNTER — TELEPHONE (OUTPATIENT)
Dept: PULMONOLOGY | Age: 54
End: 2019-05-01

## 2019-05-01 ENCOUNTER — ANTI-COAG VISIT (OUTPATIENT)
Dept: CARDIOLOGY CLINIC | Age: 54
End: 2019-05-01

## 2019-05-01 LAB — INR BLD: 3.6

## 2019-05-01 NOTE — TELEPHONE ENCOUNTER
I do not think this is okay. I believe the CT is already significantly overdue. I believe we sent letter. Recommended imaging needs to be completed ASAP. Please review the EMR and determine when testing was scheduled, why it was not obtained, what appointments were made and missed and document all for me. It is hard for me to determine exactly why we have not already had testing.

## 2019-05-01 NOTE — TELEPHONE ENCOUNTER
Patient called states she is anatoly for f/u with same day Ct chest on 7/10/19. She wanted to make sure this is ok to wait this long to be seen? 11/8/18    Assessment:  · Left parotid gland nodule, hypermetabolic   · Pulmonary Nodules, remote h/o Pulmonary Nodules at [facility] but size listed as 6 mm and smaller. Larger Pulmonary Nodules are hypermetabolic  · Mediastinal lymphadenopathy, with increased metabolic activity   · Left thyroid nodule, benign on ultrasound   · Asthma, moderate persistent  · Recent hospitalization with sotalol causing increased wheezing  · LOTTIE on home CPAP  · Pulmonary hypertension seen in past by Dr. Macy Guzman, felt to be 2/2 CHF  · Morbid obesity  · Atrial fibrillation, has remained in sinus recently     Plan:   · Referral to ENT for L parotid nodule, hypermetabolic on CT PET  · Bronchoscopy with endobronchial ultrasound (EBUS) guided biopsy Friday am, STAT INR prior to procedure. The risks and benefits of fiberoptic bronchoscopy were specifically discussed, including the goal of obtaining a diagnosis, the risks of bleeding, infection, pneumothorax, lung collapse, hospitalization and death. We also discussed the risks of sedation/anesthesia. It is my assessment that the risk of the invasive procedure is outweighed by the benefit. Patient was counseled regarding the recommended procedure, alternatives to the procedure, and possible consequences of not having any evaluation performed. We also had extensive discussion regarding other findings on CT PET, including pulmonary nodules and ENT findings. We discussed various diagnostic approaches and this was the decision utilizing a shared decision making process.    · Breo 200 (give today), PRN albuterol  · Tobacco cessation

## 2019-05-02 NOTE — TELEPHONE ENCOUNTER
Patient's test rescheduled to 5/10/19, patient offered sooner and declined. Patient is scheduled with  6/4/19. Please look for results. I will look for cancellations as well. Patient has a car now per patient.

## 2019-05-09 LAB — INR BLD: 2.2

## 2019-05-10 ENCOUNTER — HOSPITAL ENCOUNTER (OUTPATIENT)
Age: 54
Discharge: HOME | End: 2019-05-10
Payer: MEDICARE

## 2019-05-10 ENCOUNTER — HOSPITAL ENCOUNTER (OUTPATIENT)
Dept: CT IMAGING | Age: 54
Discharge: HOME | End: 2019-05-10
Payer: MEDICARE

## 2019-05-10 ENCOUNTER — ANTI-COAG VISIT (OUTPATIENT)
Dept: CARDIOLOGY CLINIC | Age: 54
End: 2019-05-10

## 2019-05-10 DIAGNOSIS — D49.0 SALIVARY GLAND TUMOR: ICD-10-CM

## 2019-05-10 DIAGNOSIS — R59.0 MEDIASTINAL LYMPHADENOPATHY: ICD-10-CM

## 2019-05-10 DIAGNOSIS — R91.1 PULMONARY NODULE: ICD-10-CM

## 2019-05-10 LAB
CREAT SERPL-MCNC: 0.9 MG/DL (ref 0.6–1.1)
GFR AFRICAN AMERICAN: >60
GFR NON-AFRICAN AMERICAN: >60

## 2019-05-10 PROCEDURE — 71260 CT THORAX DX C+: CPT

## 2019-05-10 PROCEDURE — 82565 ASSAY OF CREATININE: CPT

## 2019-05-10 PROCEDURE — 6360000004 HC RX CONTRAST MEDICATION: Performed by: INTERNAL MEDICINE

## 2019-05-10 PROCEDURE — 70470 CT HEAD/BRAIN W/O & W/DYE: CPT

## 2019-05-10 PROCEDURE — 36415 COLL VENOUS BLD VENIPUNCTURE: CPT

## 2019-05-10 PROCEDURE — 70491 CT SOFT TISSUE NECK W/DYE: CPT

## 2019-05-10 RX ADMIN — IOPAMIDOL 75 ML: 755 INJECTION, SOLUTION INTRAVENOUS at 16:08

## 2019-05-16 LAB — INR BLD: 2.1

## 2019-05-17 ENCOUNTER — ANTI-COAG VISIT (OUTPATIENT)
Dept: CARDIOLOGY CLINIC | Age: 54
End: 2019-05-17

## 2019-05-23 ENCOUNTER — ANTI-COAG VISIT (OUTPATIENT)
Dept: CARDIOLOGY CLINIC | Age: 54
End: 2019-05-23

## 2019-05-23 ENCOUNTER — TELEPHONE (OUTPATIENT)
Dept: CARDIOLOGY CLINIC | Age: 54
End: 2019-05-23

## 2019-05-23 LAB — INR BLD: 2.6

## 2019-05-23 NOTE — TELEPHONE ENCOUNTER
Spoke with the patient, she wanted to inform our office that she had a CT of the head at [address] [facility] which showed she had CVA previously. Per Dr. Catalina Lawrence MD - 05/22/2019 9:14 AM EDT  Please put in appointment for 11am with me on May 28. (My schedule is being opened up for that day any minute.)    Contacted pt and reviewed CT scan results. Stable lung nodules  Persistent thyroid and parotid masses - encouraged follow-up with ENT  Old strokes - should likely start more aggressive cholesterol regimen than Zetia alone (red yeast rice versus PCSK9 inhibitor), plan for her to talk to her Cardiologist about that at next appointment and consider carotid ultrasound. INR results under anticoag encounter.

## 2019-05-23 NOTE — TELEPHONE ENCOUNTER
Pt would like a call about her INR results. She states that she also wants someone to review her chart because she states her pulmonary and PCP both told her that she has had a stroke, but they had no way of knowing when it happened.

## 2019-05-31 ENCOUNTER — TELEPHONE (OUTPATIENT)
Dept: PULMONOLOGY | Age: 54
End: 2019-05-31

## 2019-06-06 LAB — INR BLD: 2.2

## 2019-06-11 ENCOUNTER — ANTI-COAG VISIT (OUTPATIENT)
Dept: CARDIOLOGY CLINIC | Age: 54
End: 2019-06-11

## 2019-06-13 ENCOUNTER — ANTI-COAG VISIT (OUTPATIENT)
Dept: CARDIOLOGY CLINIC | Age: 54
End: 2019-06-13

## 2019-06-13 LAB — INR BLD: 2.6

## 2019-06-14 ENCOUNTER — TELEPHONE (OUTPATIENT)
Dept: CARDIOLOGY CLINIC | Age: 54
End: 2019-06-14

## 2019-06-14 DIAGNOSIS — I47.1 ATRIAL TACHYCARDIA (HCC): ICD-10-CM

## 2019-06-14 RX ORDER — AMIODARONE HYDROCHLORIDE 200 MG/1
TABLET ORAL
Qty: 30 TABLET | Refills: 0 | Status: SHIPPED | OUTPATIENT
Start: 2019-06-14 | End: 2019-08-06 | Stop reason: SDUPTHER

## 2019-06-14 RX ORDER — DIGOXIN 125 MCG
125 TABLET ORAL DAILY
Qty: 30 TABLET | Refills: 0 | Status: SHIPPED | OUTPATIENT
Start: 2019-06-14 | End: 2019-07-12 | Stop reason: SDUPTHER

## 2019-06-14 NOTE — TELEPHONE ENCOUNTER
LV 9/26/18 Poonam   1. Continue amiodarone, digoxin, and Coumadin  2. Refer to endocrinology due to hyperthyroidism and amiodarone use  3. Patient checks INR at home and is requesting for our office to manage. Will arrange. 4. Smoking cessation is recommended  5.  Follow up in 3 months     Next Visit 7/8/19

## 2019-06-14 NOTE — TELEPHONE ENCOUNTER
Pt states that she needs refills on digoxin (LANOXIN) 125 MCG tablet and amiodarone (CORDARONE) 200 MG tablet sent to [facility] in [place].  Next ov 7/8/19

## 2019-06-24 ENCOUNTER — ANTI-COAG VISIT (OUTPATIENT)
Dept: CARDIOLOGY CLINIC | Age: 54
End: 2019-06-24

## 2019-06-24 LAB — INR BLD: 2.2

## 2019-06-25 ENCOUNTER — TELEPHONE (OUTPATIENT)
Dept: PULMONOLOGY | Age: 54
End: 2019-06-25

## 2019-06-28 ENCOUNTER — ANTI-COAG VISIT (OUTPATIENT)
Dept: CARDIOLOGY CLINIC | Age: 54
End: 2019-06-28

## 2019-06-28 LAB — INR BLD: 1.8

## 2019-07-02 NOTE — TELEPHONE ENCOUNTER
Spoke to patient declined to reschedule appointment with Remberto Oconnor today. States she is focusing on other health issues today. States she will call office back to reschedule appointment with Remberto Oconnor by next week.

## 2019-07-08 ENCOUNTER — OFFICE VISIT (OUTPATIENT)
Dept: CARDIOLOGY CLINIC | Age: 54
End: 2019-07-08
Payer: MEDICARE

## 2019-07-08 VITALS
OXYGEN SATURATION: 95 % | HEART RATE: 60 BPM | HEIGHT: 67 IN | DIASTOLIC BLOOD PRESSURE: 74 MMHG | SYSTOLIC BLOOD PRESSURE: 126 MMHG | BODY MASS INDEX: 39.63 KG/M2

## 2019-07-08 DIAGNOSIS — Z72.0 TOBACCO ABUSE: ICD-10-CM

## 2019-07-08 DIAGNOSIS — I47.1 ATRIAL TACHYCARDIA (HCC): ICD-10-CM

## 2019-07-08 DIAGNOSIS — I10 ESSENTIAL HYPERTENSION: ICD-10-CM

## 2019-07-08 DIAGNOSIS — I48.0 PAROXYSMAL ATRIAL FIBRILLATION (HCC): Primary | ICD-10-CM

## 2019-07-08 PROCEDURE — 3017F COLORECTAL CA SCREEN DOC REV: CPT | Performed by: NURSE PRACTITIONER

## 2019-07-08 PROCEDURE — 4004F PT TOBACCO SCREEN RCVD TLK: CPT | Performed by: NURSE PRACTITIONER

## 2019-07-08 PROCEDURE — 99214 OFFICE O/P EST MOD 30 MIN: CPT | Performed by: NURSE PRACTITIONER

## 2019-07-08 PROCEDURE — 93000 ELECTROCARDIOGRAM COMPLETE: CPT | Performed by: NURSE PRACTITIONER

## 2019-07-08 PROCEDURE — G8417 CALC BMI ABV UP PARAM F/U: HCPCS | Performed by: NURSE PRACTITIONER

## 2019-07-08 PROCEDURE — G8427 DOCREV CUR MEDS BY ELIG CLIN: HCPCS | Performed by: NURSE PRACTITIONER

## 2019-07-08 NOTE — LETTER
Medication Sig Start Date End Date Taking? Authorizing Provider   digoxin (LANOXIN) 125 MCG tablet Take 1 tablet by mouth daily 6/14/19  Yes DEE Pierce CNP   amiodarone (CORDARONE) 200 MG tablet 200 mg po daily 6/14/19  Yes DEE Pierce CNP   BREO ELLIPTA 200-25 MCG/INH AEPB INHALE 1 PUFF INTO THE LUNGS DAILY 4/2/19  Yes Desiree Guerra MD   DULoxetine (CYMBALTA) 60 MG extended release capsule Take 120 mg by mouth 9/25/18  Yes Historical Provider, MD   oxyCODONE (ROXICODONE) 5 MG immediate release tablet Take 20 mg by mouth 2 times daily. .   Yes Historical Provider, MD   ezetimibe (ZETIA) 10 MG tablet Take 10 mg by mouth daily   Yes Historical Provider, MD   torsemide (DEMADEX) 20 MG tablet Take 20 mg by mouth daily   Yes Historical Provider, MD   Calcium Carb-Cholecalciferol (CALCIUM-VITAMIN D) 500-200 MG-UNIT per tablet Take 1 tablet by mouth 2 times daily (with meals)   Yes Historical Provider, MD   clonazePAM (KLONOPIN) 1 MG tablet Take 1 mg by mouth 2 times daily as needed. Yes Historical Provider, MD   diclofenac sodium 1 % GEL Apply 2 g topically 2 times daily   Yes Historical Provider, MD   docusate sodium (COLACE) 100 MG capsule Take 100 mg by mouth 2 times daily   Yes Historical Provider, MD   gabapentin (NEURONTIN) 400 MG capsule Take 800 mg by mouth 3 times daily.    Yes Historical Provider, MD   Magnesium 400 MG CAPS Take by mouth 2 times daily   Yes Historical Provider, MD   methimazole (TAPAZOLE) 5 MG tablet Take 2.5 mg by mouth nightly 2.5 mg, cuts the 5 mg in half   Yes Historical Provider, MD   potassium chloride (KLOR-CON) 10 MEQ extended release tablet Take 40 mEq by mouth daily    Yes Historical Provider, MD   acetaminophen (TYLENOL) 325 MG tablet Take 650 mg by mouth every 6 hours as needed for Pain   Yes Historical Provider, MD   warfarin (COUMADIN) 7.5 MG tablet Take 7.5 mg by mouth   Yes Historical Provider, MD

## 2019-07-11 LAB — INR BLD: 2.3

## 2019-07-12 ENCOUNTER — ANTI-COAG VISIT (OUTPATIENT)
Dept: CARDIOLOGY CLINIC | Age: 54
End: 2019-07-12

## 2019-07-12 ENCOUNTER — TELEPHONE (OUTPATIENT)
Dept: CARDIOLOGY CLINIC | Age: 54
End: 2019-07-12

## 2019-07-12 DIAGNOSIS — I48.0 PAROXYSMAL ATRIAL FIBRILLATION (HCC): Primary | ICD-10-CM

## 2019-07-12 RX ORDER — DIGOXIN 125 MCG
125 TABLET ORAL DAILY
Qty: 30 TABLET | Refills: 1 | Status: SHIPPED | OUTPATIENT
Start: 2019-07-12 | End: 2019-09-09 | Stop reason: SDUPTHER

## 2019-07-12 NOTE — TELEPHONE ENCOUNTER
LMOVM: INR is 2.3. Per protocol, no changes. Continue 1.25 mg M,F and 2.5 mg all other days. Recheck 1 week(s).  Provider notified. ~ Francisca Muhammad RN

## 2019-07-19 ENCOUNTER — ANTI-COAG VISIT (OUTPATIENT)
Dept: CARDIOLOGY CLINIC | Age: 54
End: 2019-07-19

## 2019-07-19 LAB — INR BLD: 2

## 2019-07-26 NOTE — TELEPHONE ENCOUNTER
Patient clearly understands our recommendation for follow-up. It is her choice whether or not she chooses to follow that recommendation. It is okay to wait for her to see me and Dr. Mancini Sat when she is ready.   She does understand that malignancy is in the differential.  From my perspective okay to close this encounter

## 2019-08-01 LAB — INR BLD: 2.2

## 2019-08-02 ENCOUNTER — ANTI-COAG VISIT (OUTPATIENT)
Dept: CARDIOLOGY CLINIC | Age: 54
End: 2019-08-02

## 2019-08-06 DIAGNOSIS — I47.1 ATRIAL TACHYCARDIA (HCC): ICD-10-CM

## 2019-08-06 RX ORDER — AMIODARONE HYDROCHLORIDE 200 MG/1
TABLET ORAL
Qty: 30 TABLET | Refills: 0 | Status: SHIPPED | OUTPATIENT
Start: 2019-08-06 | End: 2019-09-09 | Stop reason: SDUPTHER

## 2019-08-08 LAB — INR BLD: 2.2

## 2019-08-09 ENCOUNTER — ANTI-COAG VISIT (OUTPATIENT)
Dept: CARDIOLOGY CLINIC | Age: 54
End: 2019-08-09

## 2019-08-15 ENCOUNTER — ANTI-COAG VISIT (OUTPATIENT)
Dept: CARDIOLOGY CLINIC | Age: 54
End: 2019-08-15

## 2019-08-15 LAB — INR BLD: 2.3

## 2019-08-29 LAB — INR BLD: 2.2

## 2019-08-30 ENCOUNTER — ANTI-COAG VISIT (OUTPATIENT)
Dept: CARDIOLOGY CLINIC | Age: 54
End: 2019-08-30

## 2019-09-05 ENCOUNTER — ANTI-COAG VISIT (OUTPATIENT)
Dept: CARDIOLOGY CLINIC | Age: 54
End: 2019-09-05

## 2019-09-05 ENCOUNTER — OFFICE VISIT (OUTPATIENT)
Dept: ENDOCRINOLOGY | Age: 54
End: 2019-09-05
Payer: MEDICARE

## 2019-09-05 VITALS
HEIGHT: 67 IN | BODY MASS INDEX: 31.39 KG/M2 | SYSTOLIC BLOOD PRESSURE: 133 MMHG | DIASTOLIC BLOOD PRESSURE: 88 MMHG | OXYGEN SATURATION: 96 % | WEIGHT: 200 LBS | HEART RATE: 111 BPM

## 2019-09-05 DIAGNOSIS — E04.2 MULTIPLE THYROID NODULES: ICD-10-CM

## 2019-09-05 DIAGNOSIS — E05.90 HYPERTHYROIDISM: ICD-10-CM

## 2019-09-05 DIAGNOSIS — E05.90 HYPERTHYROIDISM: Primary | ICD-10-CM

## 2019-09-05 DIAGNOSIS — I47.19 ATRIAL TACHYCARDIA: ICD-10-CM

## 2019-09-05 LAB
A/G RATIO: 1.2 (ref 1.1–2.2)
ALBUMIN SERPL-MCNC: 3.9 G/DL (ref 3.4–5)
ALBUMIN SERPL-MCNC: 4 G/DL (ref 3.4–5)
ALP BLD-CCNC: 116 U/L (ref 40–129)
ALP BLD-CCNC: 120 U/L (ref 40–129)
ALT SERPL-CCNC: <5 U/L (ref 10–40)
ALT SERPL-CCNC: <5 U/L (ref 10–40)
ANION GAP SERPL CALCULATED.3IONS-SCNC: 15 MMOL/L (ref 3–16)
AST SERPL-CCNC: 12 U/L (ref 15–37)
AST SERPL-CCNC: 13 U/L (ref 15–37)
BILIRUB SERPL-MCNC: 0.3 MG/DL (ref 0–1)
BILIRUB SERPL-MCNC: 0.3 MG/DL (ref 0–1)
BILIRUBIN DIRECT: <0.2 MG/DL (ref 0–0.3)
BILIRUBIN, INDIRECT: ABNORMAL MG/DL (ref 0–1)
BUN BLDV-MCNC: 10 MG/DL (ref 7–20)
CALCIUM SERPL-MCNC: 9 MG/DL (ref 8.3–10.6)
CHLORIDE BLD-SCNC: 98 MMOL/L (ref 99–110)
CO2: 25 MMOL/L (ref 21–32)
CREAT SERPL-MCNC: 0.9 MG/DL (ref 0.6–1.1)
DIGOXIN LEVEL: 0.9 NG/ML (ref 0.8–2)
GFR AFRICAN AMERICAN: >60
GFR NON-AFRICAN AMERICAN: >60
GLOBULIN: 3.2 G/DL
GLUCOSE BLD-MCNC: 101 MG/DL (ref 70–99)
INR BLD: 1.6
POTASSIUM SERPL-SCNC: 4.1 MMOL/L (ref 3.5–5.1)
SODIUM BLD-SCNC: 138 MMOL/L (ref 136–145)
T3 FREE: 3.6 PG/ML (ref 2.3–4.2)
T4 FREE: 1.6 NG/DL (ref 0.9–1.8)
TOTAL PROTEIN: 7.1 G/DL (ref 6.4–8.2)
TOTAL PROTEIN: 7.1 G/DL (ref 6.4–8.2)
TSH SERPL DL<=0.05 MIU/L-ACNC: 3.53 UIU/ML (ref 0.27–4.2)

## 2019-09-05 PROCEDURE — G8427 DOCREV CUR MEDS BY ELIG CLIN: HCPCS | Performed by: INTERNAL MEDICINE

## 2019-09-05 PROCEDURE — 4004F PT TOBACCO SCREEN RCVD TLK: CPT | Performed by: INTERNAL MEDICINE

## 2019-09-05 PROCEDURE — 99204 OFFICE O/P NEW MOD 45 MIN: CPT | Performed by: INTERNAL MEDICINE

## 2019-09-05 PROCEDURE — G8417 CALC BMI ABV UP PARAM F/U: HCPCS | Performed by: INTERNAL MEDICINE

## 2019-09-05 PROCEDURE — 3017F COLORECTAL CA SCREEN DOC REV: CPT | Performed by: INTERNAL MEDICINE

## 2019-09-05 NOTE — PROGRESS NOTES
 Heart Disease Mother     Heart Disease Father      Social History     Tobacco Use   Smoking Status Current Every Day Smoker    Packs/day: 1.00    Years: 35.00    Pack years: 35.00    Types: Cigarettes   Smokeless Tobacco Never Used      Social History     Substance and Sexual Activity   Alcohol Use No    Comment: social       HPI    Yusuf Ambrocio is a 62 y.o. female who is here for initial evaluation for management of hyperthyroidism  Patient is being seen at the request of Anders Trejo, APRN - CNP     PCP  Alex Salcedo. Kun Bowers MD, MD    Patient has a PMH of Afib, CVA, thyroid disease, smoking, COPD, lung nodules. She was following with  endocrinology in the past    She was diagnosed with hyperthyroidism in 2014  Thyroid USG showed nodules. TSI normal in 02/14    Uptake scan showed low uptake of 1.8 %     She was started on methimazole. Has been taking 2.5 to 7.5 mg since 2014    Current dose is 5 mg daily. FH of thyroid disease : Mother had thyroid nodules. Thyroid US in 12/18 showed 6 mm nodule in R lobe and 2.5 cm mixed solid cystic nodule in Left lobe. She is currently on amiodarone 200 mg daily since 08/18    She c/o feeling tired and fatigued  Has been gaining weight    Has heat intolerance. Has tremors in her hands      Review of system Please see the scanned document filled by the patient, reviewed  by me today. Physical Exam   Constitutional: She is oriented to person, place, and time. She appears well-developed. No distress. HENT:   Mouth/Throat: Oropharynx is clear and moist.   Eyes: EOM are normal.   Neck: No thyromegaly present. Cardiovascular: Normal rate and normal heart sounds. Pulmonary/Chest: Effort normal. No respiratory distress. She has no wheezes. Abdominal: Soft. Bowel sounds are normal. There is no tenderness. Musculoskeletal: She exhibits no edema. Neurological: She is alert and oriented to person, place, and time.    Skin: Skin is hyperthyroidism. She is currently on methimazole 5 mg daily. Will repeat her levels and adjust dose as needed        2. Thyroid nodules. Thyroid US in 12/18 showed 6 mm nodule in R lobe and 2.5 cm mixed solid cystic nodule in Left lobe. Reviewed images in office today. Will repeat the US      3. Afib/CHF. As per cardiology      4. Parotid Mass.  As per ENT

## 2019-09-06 ENCOUNTER — TELEPHONE (OUTPATIENT)
Dept: CARDIOLOGY CLINIC | Age: 54
End: 2019-09-06

## 2019-09-06 NOTE — TELEPHONE ENCOUNTER
----- Message from DEE Terrazas CNP sent at 9/6/2019  9:31 AM EDT -----  Please inform the patient that her kidney function and liver function look good from an amiodarone standpoint.

## 2019-09-06 NOTE — TELEPHONE ENCOUNTER
I contacted pt and gave her NPBB's result information. Pt was pleased to hear about results. She is requesting a 3month supply on her Digoxin and Amiodarone medications and to please send to [place], [place]  [address] - [place] [phone] - F [phone]

## 2019-09-07 LAB — TSH RECEPTOR AB: <0.9 IU/L

## 2019-09-09 DIAGNOSIS — I47.1 ATRIAL TACHYCARDIA (HCC): ICD-10-CM

## 2019-09-09 DIAGNOSIS — I48.0 PAROXYSMAL ATRIAL FIBRILLATION (HCC): ICD-10-CM

## 2019-09-09 RX ORDER — AMIODARONE HYDROCHLORIDE 200 MG/1
TABLET ORAL
Qty: 90 TABLET | Refills: 1 | Status: SHIPPED | OUTPATIENT
Start: 2019-09-09 | End: 2020-03-04

## 2019-09-09 RX ORDER — DIGOXIN 125 MCG
125 TABLET ORAL DAILY
Qty: 90 TABLET | Refills: 3 | Status: SHIPPED | OUTPATIENT
Start: 2019-09-09 | End: 2020-09-22

## 2019-09-19 ENCOUNTER — ANTI-COAG VISIT (OUTPATIENT)
Dept: CARDIOLOGY CLINIC | Age: 54
End: 2019-09-19

## 2019-09-19 LAB — INR BLD: 3

## 2019-09-26 LAB — INR BLD: 2.2

## 2019-09-27 ENCOUNTER — APPOINTMENT (OUTPATIENT)
Dept: GENERAL RADIOLOGY | Age: 54
DRG: 193 | End: 2019-09-27
Payer: MEDICARE

## 2019-09-27 ENCOUNTER — HOSPITAL ENCOUNTER (INPATIENT)
Age: 54
LOS: 4 days | Discharge: HOME | DRG: 193 | End: 2019-10-01
Attending: EMERGENCY MEDICINE | Admitting: HOSPITALIST
Payer: MEDICARE

## 2019-09-27 DIAGNOSIS — J44.1 COPD EXACERBATION (HCC): Primary | ICD-10-CM

## 2019-09-27 DIAGNOSIS — J18.9 PNEUMONIA DUE TO ORGANISM: ICD-10-CM

## 2019-09-27 LAB
A/G RATIO: 1 (ref 1.1–2.2)
ALBUMIN SERPL-MCNC: 3.6 G/DL (ref 3.4–5)
ALP BLD-CCNC: 130 U/L (ref 40–129)
ALT SERPL-CCNC: <5 U/L (ref 10–40)
ANION GAP SERPL CALCULATED.3IONS-SCNC: 11 MMOL/L (ref 3–16)
APTT: 42.6 SEC (ref 26–36)
AST SERPL-CCNC: 11 U/L (ref 15–37)
BASOPHILS ABSOLUTE: 0 K/UL (ref 0–0.2)
BASOPHILS RELATIVE PERCENT: 0.5 %
BILIRUB SERPL-MCNC: 0.4 MG/DL (ref 0–1)
BUN BLDV-MCNC: 8 MG/DL (ref 7–20)
CALCIUM SERPL-MCNC: 7.8 MG/DL (ref 8.3–10.6)
CHLORIDE BLD-SCNC: 100 MMOL/L (ref 99–110)
CO2: 28 MMOL/L (ref 21–32)
CREAT SERPL-MCNC: 1.1 MG/DL (ref 0.6–1.1)
EKG ATRIAL RATE: 224 BPM
EKG DIAGNOSIS: NORMAL
EKG P AXIS: 131 DEGREES
EKG Q-T INTERVAL: 378 MS
EKG QRS DURATION: 104 MS
EKG QTC CALCULATION (BAZETT): 515 MS
EKG R AXIS: 108 DEGREES
EKG T AXIS: -52 DEGREES
EKG VENTRICULAR RATE: 112 BPM
EOSINOPHILS ABSOLUTE: 0.1 K/UL (ref 0–0.6)
EOSINOPHILS RELATIVE PERCENT: 1 %
GFR AFRICAN AMERICAN: >60
GFR NON-AFRICAN AMERICAN: 51
GLOBULIN: 3.7 G/DL
GLUCOSE BLD-MCNC: 142 MG/DL (ref 70–99)
HCT VFR BLD CALC: 39.7 % (ref 36–48)
HEMOGLOBIN: 13.2 G/DL (ref 12–16)
INR BLD: 2.81 (ref 0.86–1.14)
LACTIC ACID, SEPSIS: 3.4 MMOL/L (ref 0.4–1.9)
LACTIC ACID, SEPSIS: 4 MMOL/L (ref 0.4–1.9)
LACTIC ACID, SEPSIS: 4.5 MMOL/L (ref 0.4–1.9)
LYMPHOCYTES ABSOLUTE: 1.2 K/UL (ref 1–5.1)
LYMPHOCYTES RELATIVE PERCENT: 12.1 %
MCH RBC QN AUTO: 30 PG (ref 26–34)
MCHC RBC AUTO-ENTMCNC: 33.2 G/DL (ref 31–36)
MCV RBC AUTO: 90.3 FL (ref 80–100)
MONOCYTES ABSOLUTE: 0.5 K/UL (ref 0–1.3)
MONOCYTES RELATIVE PERCENT: 4.8 %
NEUTROPHILS ABSOLUTE: 8.4 K/UL (ref 1.7–7.7)
NEUTROPHILS RELATIVE PERCENT: 81.6 %
PDW BLD-RTO: 14.3 % (ref 12.4–15.4)
PLATELET # BLD: 321 K/UL (ref 135–450)
PMV BLD AUTO: 6.6 FL (ref 5–10.5)
POTASSIUM REFLEX MAGNESIUM: 3.9 MMOL/L (ref 3.5–5.1)
PRO-BNP: 378 PG/ML (ref 0–124)
PROTHROMBIN TIME: 32 SEC (ref 9.8–13)
RBC # BLD: 4.4 M/UL (ref 4–5.2)
SODIUM BLD-SCNC: 139 MMOL/L (ref 136–145)
TOTAL PROTEIN: 7.3 G/DL (ref 6.4–8.2)
TROPONIN: <0.01 NG/ML
WBC # BLD: 10.3 K/UL (ref 4–11)

## 2019-09-27 PROCEDURE — 93010 ELECTROCARDIOGRAM REPORT: CPT | Performed by: INTERNAL MEDICINE

## 2019-09-27 PROCEDURE — 80053 COMPREHEN METABOLIC PANEL: CPT

## 2019-09-27 PROCEDURE — 85610 PROTHROMBIN TIME: CPT

## 2019-09-27 PROCEDURE — 96367 TX/PROPH/DG ADDL SEQ IV INF: CPT

## 2019-09-27 PROCEDURE — 6370000000 HC RX 637 (ALT 250 FOR IP): Performed by: HOSPITALIST

## 2019-09-27 PROCEDURE — 1200000000 HC SEMI PRIVATE

## 2019-09-27 PROCEDURE — 6370000000 HC RX 637 (ALT 250 FOR IP): Performed by: NURSE PRACTITIONER

## 2019-09-27 PROCEDURE — 6360000002 HC RX W HCPCS: Performed by: NURSE PRACTITIONER

## 2019-09-27 PROCEDURE — 83880 ASSAY OF NATRIURETIC PEPTIDE: CPT

## 2019-09-27 PROCEDURE — 85730 THROMBOPLASTIN TIME PARTIAL: CPT

## 2019-09-27 PROCEDURE — 96361 HYDRATE IV INFUSION ADD-ON: CPT

## 2019-09-27 PROCEDURE — 96375 TX/PRO/DX INJ NEW DRUG ADDON: CPT

## 2019-09-27 PROCEDURE — 94660 CPAP INITIATION&MGMT: CPT

## 2019-09-27 PROCEDURE — 71045 X-RAY EXAM CHEST 1 VIEW: CPT

## 2019-09-27 PROCEDURE — 94150 VITAL CAPACITY TEST: CPT

## 2019-09-27 PROCEDURE — 96365 THER/PROPH/DIAG IV INF INIT: CPT

## 2019-09-27 PROCEDURE — 94761 N-INVAS EAR/PLS OXIMETRY MLT: CPT

## 2019-09-27 PROCEDURE — 94640 AIRWAY INHALATION TREATMENT: CPT

## 2019-09-27 PROCEDURE — 83605 ASSAY OF LACTIC ACID: CPT

## 2019-09-27 PROCEDURE — 85025 COMPLETE CBC W/AUTO DIFF WBC: CPT

## 2019-09-27 PROCEDURE — 36415 COLL VENOUS BLD VENIPUNCTURE: CPT

## 2019-09-27 PROCEDURE — 84484 ASSAY OF TROPONIN QUANT: CPT

## 2019-09-27 PROCEDURE — 99285 EMERGENCY DEPT VISIT HI MDM: CPT

## 2019-09-27 PROCEDURE — 93005 ELECTROCARDIOGRAM TRACING: CPT | Performed by: NURSE PRACTITIONER

## 2019-09-27 PROCEDURE — 2580000003 HC RX 258: Performed by: HOSPITALIST

## 2019-09-27 PROCEDURE — 87040 BLOOD CULTURE FOR BACTERIA: CPT

## 2019-09-27 PROCEDURE — 94669 MECHANICAL CHEST WALL OSCILL: CPT

## 2019-09-27 PROCEDURE — 2580000003 HC RX 258: Performed by: NURSE PRACTITIONER

## 2019-09-27 PROCEDURE — 2700000000 HC OXYGEN THERAPY PER DAY

## 2019-09-27 RX ORDER — DIGOXIN 125 MCG
125 TABLET ORAL DAILY
Status: DISCONTINUED | OUTPATIENT
Start: 2019-09-28 | End: 2019-10-01 | Stop reason: HOSPADM

## 2019-09-27 RX ORDER — EZETIMIBE 10 MG/1
10 TABLET ORAL DAILY
Status: DISCONTINUED | OUTPATIENT
Start: 2019-09-28 | End: 2019-09-27 | Stop reason: RX

## 2019-09-27 RX ORDER — ONDANSETRON 2 MG/ML
4 INJECTION INTRAMUSCULAR; INTRAVENOUS EVERY 6 HOURS PRN
Status: DISCONTINUED | OUTPATIENT
Start: 2019-09-27 | End: 2019-10-01 | Stop reason: HOSPADM

## 2019-09-27 RX ORDER — IPRATROPIUM BROMIDE AND ALBUTEROL SULFATE 2.5; .5 MG/3ML; MG/3ML
3 SOLUTION RESPIRATORY (INHALATION) ONCE
Status: COMPLETED | OUTPATIENT
Start: 2019-09-27 | End: 2019-09-27

## 2019-09-27 RX ORDER — BACLOFEN 10 MG/1
10 TABLET ORAL 3 TIMES DAILY
Status: DISCONTINUED | OUTPATIENT
Start: 2019-09-27 | End: 2019-09-28

## 2019-09-27 RX ORDER — 0.9 % SODIUM CHLORIDE 0.9 %
1979 INTRAVENOUS SOLUTION INTRAVENOUS ONCE
Status: COMPLETED | OUTPATIENT
Start: 2019-09-27 | End: 2019-09-27

## 2019-09-27 RX ORDER — SODIUM CHLORIDE 9 MG/ML
INJECTION, SOLUTION INTRAVENOUS CONTINUOUS
Status: DISCONTINUED | OUTPATIENT
Start: 2019-09-27 | End: 2019-09-28

## 2019-09-27 RX ORDER — IPRATROPIUM BROMIDE AND ALBUTEROL SULFATE 2.5; .5 MG/3ML; MG/3ML
1 SOLUTION RESPIRATORY (INHALATION) EVERY 4 HOURS
Status: DISCONTINUED | OUTPATIENT
Start: 2019-09-28 | End: 2019-09-28

## 2019-09-27 RX ORDER — METHYLPREDNISOLONE SODIUM SUCCINATE 40 MG/ML
40 INJECTION, POWDER, LYOPHILIZED, FOR SOLUTION INTRAMUSCULAR; INTRAVENOUS EVERY 12 HOURS
Status: DISCONTINUED | OUTPATIENT
Start: 2019-09-28 | End: 2019-10-01 | Stop reason: HOSPADM

## 2019-09-27 RX ORDER — DOCUSATE SODIUM 100 MG/1
100 CAPSULE, LIQUID FILLED ORAL 2 TIMES DAILY
Status: DISCONTINUED | OUTPATIENT
Start: 2019-09-27 | End: 2019-10-01 | Stop reason: HOSPADM

## 2019-09-27 RX ORDER — ACETAMINOPHEN 325 MG/1
650 TABLET ORAL EVERY 6 HOURS PRN
Status: DISCONTINUED | OUTPATIENT
Start: 2019-09-27 | End: 2019-10-01 | Stop reason: HOSPADM

## 2019-09-27 RX ORDER — SODIUM CHLORIDE 0.9 % (FLUSH) 0.9 %
10 SYRINGE (ML) INJECTION PRN
Status: DISCONTINUED | OUTPATIENT
Start: 2019-09-27 | End: 2019-10-01 | Stop reason: HOSPADM

## 2019-09-27 RX ORDER — IPRATROPIUM BROMIDE AND ALBUTEROL SULFATE 2.5; .5 MG/3ML; MG/3ML
1 SOLUTION RESPIRATORY (INHALATION)
Status: DISCONTINUED | OUTPATIENT
Start: 2019-09-28 | End: 2019-09-27

## 2019-09-27 RX ORDER — METHIMAZOLE 5 MG/1
5 TABLET ORAL NIGHTLY
Status: DISCONTINUED | OUTPATIENT
Start: 2019-09-27 | End: 2019-10-01 | Stop reason: HOSPADM

## 2019-09-27 RX ORDER — AMIODARONE HYDROCHLORIDE 200 MG/1
200 TABLET ORAL DAILY
Status: DISCONTINUED | OUTPATIENT
Start: 2019-09-28 | End: 2019-10-01 | Stop reason: HOSPADM

## 2019-09-27 RX ORDER — METHYLPREDNISOLONE SODIUM SUCCINATE 125 MG/2ML
125 INJECTION, POWDER, LYOPHILIZED, FOR SOLUTION INTRAMUSCULAR; INTRAVENOUS ONCE
Status: COMPLETED | OUTPATIENT
Start: 2019-09-27 | End: 2019-09-27

## 2019-09-27 RX ORDER — IPRATROPIUM BROMIDE AND ALBUTEROL SULFATE 2.5; .5 MG/3ML; MG/3ML
1 SOLUTION RESPIRATORY (INHALATION) ONCE
Status: COMPLETED | OUTPATIENT
Start: 2019-09-27 | End: 2019-09-27

## 2019-09-27 RX ORDER — GABAPENTIN 400 MG/1
800 CAPSULE ORAL 3 TIMES DAILY
Status: DISCONTINUED | OUTPATIENT
Start: 2019-09-27 | End: 2019-10-01 | Stop reason: HOSPADM

## 2019-09-27 RX ORDER — SODIUM CHLORIDE 0.9 % (FLUSH) 0.9 %
10 SYRINGE (ML) INJECTION EVERY 12 HOURS SCHEDULED
Status: DISCONTINUED | OUTPATIENT
Start: 2019-09-27 | End: 2019-10-01 | Stop reason: HOSPADM

## 2019-09-27 RX ORDER — WARFARIN SODIUM 2.5 MG/1
2.5 TABLET ORAL
Status: COMPLETED | OUTPATIENT
Start: 2019-09-27 | End: 2019-09-27

## 2019-09-27 RX ORDER — OXYCODONE HYDROCHLORIDE 5 MG/1
10 TABLET ORAL ONCE
Status: COMPLETED | OUTPATIENT
Start: 2019-09-27 | End: 2019-09-27

## 2019-09-27 RX ORDER — DULOXETIN HYDROCHLORIDE 60 MG/1
120 CAPSULE, DELAYED RELEASE ORAL DAILY
Status: DISCONTINUED | OUTPATIENT
Start: 2019-09-28 | End: 2019-10-01 | Stop reason: HOSPADM

## 2019-09-27 RX ORDER — OXYCODONE HYDROCHLORIDE 5 MG/1
10 TABLET ORAL 2 TIMES DAILY
Status: DISCONTINUED | OUTPATIENT
Start: 2019-09-27 | End: 2019-09-28

## 2019-09-27 RX ORDER — 0.9 % SODIUM CHLORIDE 0.9 %
30 INTRAVENOUS SOLUTION INTRAVENOUS ONCE
Status: DISCONTINUED | OUTPATIENT
Start: 2019-09-27 | End: 2019-09-27

## 2019-09-27 RX ORDER — 0.9 % SODIUM CHLORIDE 0.9 %
1000 INTRAVENOUS SOLUTION INTRAVENOUS ONCE
Status: COMPLETED | OUTPATIENT
Start: 2019-09-27 | End: 2019-09-27

## 2019-09-27 RX ORDER — CLONAZEPAM 1 MG/1
1 TABLET ORAL 2 TIMES DAILY PRN
Status: DISCONTINUED | OUTPATIENT
Start: 2019-09-27 | End: 2019-09-28

## 2019-09-27 RX ADMIN — OXYCODONE HYDROCHLORIDE 10 MG: 5 TABLET ORAL at 22:31

## 2019-09-27 RX ADMIN — IPRATROPIUM BROMIDE AND ALBUTEROL SULFATE 3 AMPULE: .5; 3 SOLUTION RESPIRATORY (INHALATION) at 16:04

## 2019-09-27 RX ADMIN — Medication 2 PUFF: at 23:00

## 2019-09-27 RX ADMIN — IPRATROPIUM BROMIDE AND ALBUTEROL SULFATE 1 AMPULE: .5; 3 SOLUTION RESPIRATORY (INHALATION) at 23:00

## 2019-09-27 RX ADMIN — METHYLPREDNISOLONE SODIUM SUCCINATE 125 MG: 125 INJECTION, POWDER, FOR SOLUTION INTRAMUSCULAR; INTRAVENOUS at 16:04

## 2019-09-27 RX ADMIN — SODIUM CHLORIDE 1979 ML: 9 INJECTION, SOLUTION INTRAVENOUS at 19:05

## 2019-09-27 RX ADMIN — METHIMAZOLE 5 MG: 5 TABLET ORAL at 22:31

## 2019-09-27 RX ADMIN — SODIUM CHLORIDE 1000 ML: 9 INJECTION, SOLUTION INTRAVENOUS at 16:23

## 2019-09-27 RX ADMIN — CEFTRIAXONE SODIUM 1 G: 1 INJECTION, POWDER, FOR SOLUTION INTRAMUSCULAR; INTRAVENOUS at 19:04

## 2019-09-27 RX ADMIN — SODIUM CHLORIDE: 9 INJECTION, SOLUTION INTRAVENOUS at 22:31

## 2019-09-27 RX ADMIN — WARFARIN SODIUM 2.5 MG: 2.5 TABLET ORAL at 22:37

## 2019-09-27 RX ADMIN — AZITHROMYCIN DIHYDRATE 500 MG: 500 INJECTION, POWDER, LYOPHILIZED, FOR SOLUTION INTRAVENOUS at 19:39

## 2019-09-27 RX ADMIN — DOCUSATE SODIUM 100 MG: 100 CAPSULE, LIQUID FILLED ORAL at 22:31

## 2019-09-27 RX ADMIN — OXYCODONE HYDROCHLORIDE 10 MG: 5 TABLET ORAL at 16:04

## 2019-09-27 RX ADMIN — GABAPENTIN 800 MG: 400 CAPSULE ORAL at 22:31

## 2019-09-27 RX ADMIN — Medication 10 ML: at 22:32

## 2019-09-27 RX ADMIN — CLONAZEPAM 1 MG: 1 TABLET ORAL at 22:31

## 2019-09-27 RX ADMIN — BACLOFEN 10 MG: 10 TABLET ORAL at 22:31

## 2019-09-27 RX ADMIN — IPRATROPIUM BROMIDE AND ALBUTEROL SULFATE 1 AMPULE: .5; 3 SOLUTION RESPIRATORY (INHALATION) at 20:33

## 2019-09-27 ASSESSMENT — ENCOUNTER SYMPTOMS
SORE THROAT: 0
CHEST TIGHTNESS: 1
NAUSEA: 0
SHORTNESS OF BREATH: 1
ABDOMINAL PAIN: 0
WHEEZING: 1
VOMITING: 0
BACK PAIN: 1

## 2019-09-27 ASSESSMENT — PAIN DESCRIPTION - PROGRESSION: CLINICAL_PROGRESSION: NOT CHANGED

## 2019-09-27 ASSESSMENT — PAIN SCALES - GENERAL
PAINLEVEL_OUTOF10: 7

## 2019-09-27 ASSESSMENT — PAIN DESCRIPTION - ORIENTATION
ORIENTATION: RIGHT
ORIENTATION: LOWER;RIGHT

## 2019-09-27 ASSESSMENT — PAIN - FUNCTIONAL ASSESSMENT: PAIN_FUNCTIONAL_ASSESSMENT: PREVENTS OR INTERFERES SOME ACTIVE ACTIVITIES AND ADLS

## 2019-09-27 ASSESSMENT — PAIN DESCRIPTION - PAIN TYPE
TYPE: CHRONIC PAIN
TYPE: CHRONIC PAIN

## 2019-09-27 ASSESSMENT — PAIN DESCRIPTION - LOCATION
LOCATION: BACK;HIP
LOCATION: BACK;HIP

## 2019-09-27 ASSESSMENT — PAIN DESCRIPTION - FREQUENCY: FREQUENCY: CONTINUOUS

## 2019-09-27 ASSESSMENT — PAIN DESCRIPTION - DESCRIPTORS: DESCRIPTORS: ACHING;DISCOMFORT

## 2019-09-27 ASSESSMENT — PAIN DESCRIPTION - ONSET: ONSET: ON-GOING

## 2019-09-28 LAB
ANION GAP SERPL CALCULATED.3IONS-SCNC: 11 MMOL/L (ref 3–16)
BASOPHILS ABSOLUTE: 0 K/UL (ref 0–0.2)
BASOPHILS RELATIVE PERCENT: 0 %
BUN BLDV-MCNC: 10 MG/DL (ref 7–20)
CALCIUM SERPL-MCNC: 8.2 MG/DL (ref 8.3–10.6)
CHLORIDE BLD-SCNC: 103 MMOL/L (ref 99–110)
CO2: 24 MMOL/L (ref 21–32)
CREAT SERPL-MCNC: 0.8 MG/DL (ref 0.6–1.1)
EOSINOPHILS ABSOLUTE: 0 K/UL (ref 0–0.6)
EOSINOPHILS RELATIVE PERCENT: 0 %
GFR AFRICAN AMERICAN: >60
GFR NON-AFRICAN AMERICAN: >60
GLUCOSE BLD-MCNC: 146 MG/DL (ref 70–99)
HCT VFR BLD CALC: 35.7 % (ref 36–48)
HEMOGLOBIN: 11.7 G/DL (ref 12–16)
INR BLD: 2.36 (ref 0.86–1.14)
LACTIC ACID, SEPSIS: 2.7 MMOL/L (ref 0.4–1.9)
LACTIC ACID, SEPSIS: 2.7 MMOL/L (ref 0.4–1.9)
LACTIC ACID, SEPSIS: 5.5 MMOL/L (ref 0.4–1.9)
LYMPHOCYTES ABSOLUTE: 0.4 K/UL (ref 1–5.1)
LYMPHOCYTES RELATIVE PERCENT: 5.3 %
MCH RBC QN AUTO: 29.8 PG (ref 26–34)
MCHC RBC AUTO-ENTMCNC: 32.9 G/DL (ref 31–36)
MCV RBC AUTO: 90.7 FL (ref 80–100)
MONOCYTES ABSOLUTE: 0.1 K/UL (ref 0–1.3)
MONOCYTES RELATIVE PERCENT: 1.2 %
NEUTROPHILS ABSOLUTE: 7.1 K/UL (ref 1.7–7.7)
NEUTROPHILS RELATIVE PERCENT: 93.5 %
PDW BLD-RTO: 14.2 % (ref 12.4–15.4)
PLATELET # BLD: 277 K/UL (ref 135–450)
PMV BLD AUTO: 6.9 FL (ref 5–10.5)
POTASSIUM REFLEX MAGNESIUM: 3.6 MMOL/L (ref 3.5–5.1)
PROTHROMBIN TIME: 26.9 SEC (ref 9.8–13)
RBC # BLD: 3.94 M/UL (ref 4–5.2)
SODIUM BLD-SCNC: 138 MMOL/L (ref 136–145)
WBC # BLD: 7.6 K/UL (ref 4–11)

## 2019-09-28 PROCEDURE — 6370000000 HC RX 637 (ALT 250 FOR IP): Performed by: INTERNAL MEDICINE

## 2019-09-28 PROCEDURE — 36415 COLL VENOUS BLD VENIPUNCTURE: CPT

## 2019-09-28 PROCEDURE — 2700000000 HC OXYGEN THERAPY PER DAY

## 2019-09-28 PROCEDURE — 6370000000 HC RX 637 (ALT 250 FOR IP): Performed by: HOSPITALIST

## 2019-09-28 PROCEDURE — 1200000000 HC SEMI PRIVATE

## 2019-09-28 PROCEDURE — 80048 BASIC METABOLIC PNL TOTAL CA: CPT

## 2019-09-28 PROCEDURE — 85610 PROTHROMBIN TIME: CPT

## 2019-09-28 PROCEDURE — 6360000002 HC RX W HCPCS: Performed by: HOSPITALIST

## 2019-09-28 PROCEDURE — 94669 MECHANICAL CHEST WALL OSCILL: CPT

## 2019-09-28 PROCEDURE — 94640 AIRWAY INHALATION TREATMENT: CPT

## 2019-09-28 PROCEDURE — 83605 ASSAY OF LACTIC ACID: CPT

## 2019-09-28 PROCEDURE — 94761 N-INVAS EAR/PLS OXIMETRY MLT: CPT

## 2019-09-28 PROCEDURE — 99232 SBSQ HOSP IP/OBS MODERATE 35: CPT | Performed by: INTERNAL MEDICINE

## 2019-09-28 PROCEDURE — 2580000003 HC RX 258: Performed by: HOSPITALIST

## 2019-09-28 PROCEDURE — 94660 CPAP INITIATION&MGMT: CPT

## 2019-09-28 PROCEDURE — 85025 COMPLETE CBC W/AUTO DIFF WBC: CPT

## 2019-09-28 RX ORDER — CLONAZEPAM 0.5 MG/1
0.5 TABLET ORAL 2 TIMES DAILY PRN
Status: DISCONTINUED | OUTPATIENT
Start: 2019-09-28 | End: 2019-10-01 | Stop reason: HOSPADM

## 2019-09-28 RX ORDER — OXYCODONE HYDROCHLORIDE 5 MG/1
10 TABLET ORAL EVERY 4 HOURS PRN
Status: DISCONTINUED | OUTPATIENT
Start: 2019-09-28 | End: 2019-10-01 | Stop reason: HOSPADM

## 2019-09-28 RX ORDER — WARFARIN SODIUM 2.5 MG/1
2.5 TABLET ORAL
Status: COMPLETED | OUTPATIENT
Start: 2019-09-28 | End: 2019-09-28

## 2019-09-28 RX ORDER — TORSEMIDE 20 MG/1
20 TABLET ORAL ONCE
Status: COMPLETED | OUTPATIENT
Start: 2019-09-28 | End: 2019-09-28

## 2019-09-28 RX ORDER — OXYCODONE HYDROCHLORIDE 5 MG/1
10 TABLET ORAL 2 TIMES DAILY PRN
Status: DISCONTINUED | OUTPATIENT
Start: 2019-09-28 | End: 2019-09-28

## 2019-09-28 RX ORDER — NICOTINE 21 MG/24HR
1 PATCH, TRANSDERMAL 24 HOURS TRANSDERMAL DAILY
Status: DISCONTINUED | OUTPATIENT
Start: 2019-09-28 | End: 2019-10-01 | Stop reason: HOSPADM

## 2019-09-28 RX ORDER — TRAMADOL HYDROCHLORIDE 50 MG/1
50 TABLET ORAL EVERY 6 HOURS PRN
Status: DISCONTINUED | OUTPATIENT
Start: 2019-09-28 | End: 2019-09-28

## 2019-09-28 RX ORDER — IPRATROPIUM BROMIDE AND ALBUTEROL SULFATE 2.5; .5 MG/3ML; MG/3ML
1 SOLUTION RESPIRATORY (INHALATION)
Status: DISCONTINUED | OUTPATIENT
Start: 2019-09-29 | End: 2019-10-01 | Stop reason: HOSPADM

## 2019-09-28 RX ORDER — BACLOFEN 10 MG/1
10 TABLET ORAL 3 TIMES DAILY PRN
Status: DISCONTINUED | OUTPATIENT
Start: 2019-09-28 | End: 2019-10-01 | Stop reason: HOSPADM

## 2019-09-28 RX ADMIN — AZITHROMYCIN DIHYDRATE 500 MG: 500 INJECTION, POWDER, LYOPHILIZED, FOR SOLUTION INTRAVENOUS at 17:32

## 2019-09-28 RX ADMIN — BACLOFEN 10 MG: 10 TABLET ORAL at 22:05

## 2019-09-28 RX ADMIN — TRAMADOL HYDROCHLORIDE 50 MG: 50 TABLET, FILM COATED ORAL at 05:11

## 2019-09-28 RX ADMIN — OXYCODONE HYDROCHLORIDE 10 MG: 5 TABLET ORAL at 12:47

## 2019-09-28 RX ADMIN — CEFTRIAXONE SODIUM 1 G: 1 INJECTION, POWDER, FOR SOLUTION INTRAMUSCULAR; INTRAVENOUS at 17:27

## 2019-09-28 RX ADMIN — Medication 10 ML: at 08:21

## 2019-09-28 RX ADMIN — IPRATROPIUM BROMIDE AND ALBUTEROL SULFATE 1 AMPULE: .5; 3 SOLUTION RESPIRATORY (INHALATION) at 03:05

## 2019-09-28 RX ADMIN — OYSTER SHELL CALCIUM WITH VITAMIN D 1 TABLET: 500; 200 TABLET, FILM COATED ORAL at 08:21

## 2019-09-28 RX ADMIN — METHYLPREDNISOLONE SODIUM SUCCINATE 40 MG: 40 INJECTION, POWDER, FOR SOLUTION INTRAMUSCULAR; INTRAVENOUS at 17:27

## 2019-09-28 RX ADMIN — DULOXETINE HYDROCHLORIDE 120 MG: 60 CAPSULE, DELAYED RELEASE ORAL at 08:21

## 2019-09-28 RX ADMIN — MAGNESIUM HYDROXIDE 30 ML: 400 SUSPENSION ORAL at 08:43

## 2019-09-28 RX ADMIN — GABAPENTIN 800 MG: 400 CAPSULE ORAL at 08:21

## 2019-09-28 RX ADMIN — GABAPENTIN 800 MG: 400 CAPSULE ORAL at 14:29

## 2019-09-28 RX ADMIN — WARFARIN SODIUM 2.5 MG: 2.5 TABLET ORAL at 17:27

## 2019-09-28 RX ADMIN — DOCUSATE SODIUM 100 MG: 100 CAPSULE, LIQUID FILLED ORAL at 08:21

## 2019-09-28 RX ADMIN — OXYCODONE HYDROCHLORIDE 10 MG: 5 TABLET ORAL at 08:19

## 2019-09-28 RX ADMIN — SODIUM CHLORIDE: 9 INJECTION, SOLUTION INTRAVENOUS at 20:28

## 2019-09-28 RX ADMIN — SODIUM CHLORIDE: 9 INJECTION, SOLUTION INTRAVENOUS at 11:36

## 2019-09-28 RX ADMIN — DOCUSATE SODIUM 100 MG: 100 CAPSULE, LIQUID FILLED ORAL at 20:28

## 2019-09-28 RX ADMIN — IPRATROPIUM BROMIDE AND ALBUTEROL SULFATE 1 AMPULE: .5; 3 SOLUTION RESPIRATORY (INHALATION) at 19:15

## 2019-09-28 RX ADMIN — CLONAZEPAM 0.5 MG: 0.5 TABLET ORAL at 20:28

## 2019-09-28 RX ADMIN — Medication 2 PUFF: at 19:15

## 2019-09-28 RX ADMIN — IPRATROPIUM BROMIDE AND ALBUTEROL SULFATE 1 AMPULE: .5; 3 SOLUTION RESPIRATORY (INHALATION) at 10:53

## 2019-09-28 RX ADMIN — IPRATROPIUM BROMIDE AND ALBUTEROL SULFATE 1 AMPULE: .5; 3 SOLUTION RESPIRATORY (INHALATION) at 15:57

## 2019-09-28 RX ADMIN — IPRATROPIUM BROMIDE AND ALBUTEROL SULFATE 1 AMPULE: .5; 3 SOLUTION RESPIRATORY (INHALATION) at 07:10

## 2019-09-28 RX ADMIN — OXYCODONE HYDROCHLORIDE 10 MG: 5 TABLET ORAL at 17:27

## 2019-09-28 RX ADMIN — IPRATROPIUM BROMIDE AND ALBUTEROL SULFATE 1 AMPULE: .5; 3 SOLUTION RESPIRATORY (INHALATION) at 23:30

## 2019-09-28 RX ADMIN — BACLOFEN 10 MG: 10 TABLET ORAL at 14:29

## 2019-09-28 RX ADMIN — ACETAMINOPHEN 650 MG: 325 TABLET ORAL at 05:00

## 2019-09-28 RX ADMIN — METHIMAZOLE 5 MG: 5 TABLET ORAL at 20:28

## 2019-09-28 RX ADMIN — TORSEMIDE 20 MG: 20 TABLET ORAL at 17:27

## 2019-09-28 RX ADMIN — GABAPENTIN 800 MG: 400 CAPSULE ORAL at 20:28

## 2019-09-28 RX ADMIN — DIGOXIN 125 MCG: 125 TABLET ORAL at 08:21

## 2019-09-28 RX ADMIN — Medication 2 PUFF: at 07:10

## 2019-09-28 RX ADMIN — OYSTER SHELL CALCIUM WITH VITAMIN D 1 TABLET: 500; 200 TABLET, FILM COATED ORAL at 17:27

## 2019-09-28 RX ADMIN — BACLOFEN 10 MG: 10 TABLET ORAL at 08:21

## 2019-09-28 RX ADMIN — OXYCODONE HYDROCHLORIDE 10 MG: 5 TABLET ORAL at 22:05

## 2019-09-28 RX ADMIN — Medication 10 ML: at 20:28

## 2019-09-28 RX ADMIN — ACETAMINOPHEN 650 MG: 325 TABLET ORAL at 11:35

## 2019-09-28 RX ADMIN — CLONAZEPAM 1 MG: 1 TABLET ORAL at 08:32

## 2019-09-28 RX ADMIN — AMIODARONE HYDROCHLORIDE 200 MG: 200 TABLET ORAL at 08:21

## 2019-09-28 RX ADMIN — METHYLPREDNISOLONE SODIUM SUCCINATE 40 MG: 40 INJECTION, POWDER, FOR SOLUTION INTRAMUSCULAR; INTRAVENOUS at 02:47

## 2019-09-28 ASSESSMENT — PAIN DESCRIPTION - FREQUENCY
FREQUENCY: CONTINUOUS
FREQUENCY: CONTINUOUS

## 2019-09-28 ASSESSMENT — PAIN DESCRIPTION - ORIENTATION
ORIENTATION: RIGHT
ORIENTATION: LOWER;RIGHT

## 2019-09-28 ASSESSMENT — PAIN SCALES - GENERAL
PAINLEVEL_OUTOF10: 6
PAINLEVEL_OUTOF10: 9
PAINLEVEL_OUTOF10: 7
PAINLEVEL_OUTOF10: 10
PAINLEVEL_OUTOF10: 10

## 2019-09-28 ASSESSMENT — PAIN DESCRIPTION - PROGRESSION: CLINICAL_PROGRESSION: NOT CHANGED

## 2019-09-28 ASSESSMENT — PAIN DESCRIPTION - ONSET
ONSET: ON-GOING
ONSET: ON-GOING

## 2019-09-28 ASSESSMENT — PAIN DESCRIPTION - LOCATION
LOCATION: BACK;HIP

## 2019-09-28 ASSESSMENT — PAIN DESCRIPTION - PAIN TYPE
TYPE: CHRONIC PAIN

## 2019-09-28 ASSESSMENT — PAIN - FUNCTIONAL ASSESSMENT: PAIN_FUNCTIONAL_ASSESSMENT: PREVENTS OR INTERFERES SOME ACTIVE ACTIVITIES AND ADLS

## 2019-09-28 ASSESSMENT — PAIN DESCRIPTION - DESCRIPTORS: DESCRIPTORS: ACHING;DISCOMFORT

## 2019-09-29 LAB
INR BLD: 2.5 (ref 0.86–1.14)
PROTHROMBIN TIME: 28.5 SEC (ref 9.8–13)

## 2019-09-29 PROCEDURE — 94660 CPAP INITIATION&MGMT: CPT

## 2019-09-29 PROCEDURE — 85610 PROTHROMBIN TIME: CPT

## 2019-09-29 PROCEDURE — 94669 MECHANICAL CHEST WALL OSCILL: CPT

## 2019-09-29 PROCEDURE — 6360000002 HC RX W HCPCS: Performed by: HOSPITALIST

## 2019-09-29 PROCEDURE — 1200000000 HC SEMI PRIVATE

## 2019-09-29 PROCEDURE — 2700000000 HC OXYGEN THERAPY PER DAY

## 2019-09-29 PROCEDURE — 2580000003 HC RX 258: Performed by: HOSPITALIST

## 2019-09-29 PROCEDURE — 6370000000 HC RX 637 (ALT 250 FOR IP): Performed by: HOSPITALIST

## 2019-09-29 PROCEDURE — 36415 COLL VENOUS BLD VENIPUNCTURE: CPT

## 2019-09-29 PROCEDURE — 94761 N-INVAS EAR/PLS OXIMETRY MLT: CPT

## 2019-09-29 PROCEDURE — 99232 SBSQ HOSP IP/OBS MODERATE 35: CPT | Performed by: INTERNAL MEDICINE

## 2019-09-29 PROCEDURE — 6370000000 HC RX 637 (ALT 250 FOR IP): Performed by: INTERNAL MEDICINE

## 2019-09-29 PROCEDURE — 94640 AIRWAY INHALATION TREATMENT: CPT

## 2019-09-29 RX ORDER — WARFARIN SODIUM 2.5 MG/1
2.5 TABLET ORAL
Status: COMPLETED | OUTPATIENT
Start: 2019-09-29 | End: 2019-09-29

## 2019-09-29 RX ORDER — TORSEMIDE 20 MG/1
20 TABLET ORAL DAILY
Status: DISCONTINUED | OUTPATIENT
Start: 2019-09-29 | End: 2019-10-01 | Stop reason: HOSPADM

## 2019-09-29 RX ORDER — TORSEMIDE 20 MG/1
80 TABLET ORAL DAILY
Status: DISCONTINUED | OUTPATIENT
Start: 2019-09-29 | End: 2019-09-29

## 2019-09-29 RX ADMIN — DIGOXIN 125 MCG: 125 TABLET ORAL at 08:37

## 2019-09-29 RX ADMIN — OXYCODONE HYDROCHLORIDE 10 MG: 5 TABLET ORAL at 11:26

## 2019-09-29 RX ADMIN — IPRATROPIUM BROMIDE AND ALBUTEROL SULFATE 1 AMPULE: .5; 3 SOLUTION RESPIRATORY (INHALATION) at 22:40

## 2019-09-29 RX ADMIN — OXYCODONE HYDROCHLORIDE 10 MG: 5 TABLET ORAL at 06:25

## 2019-09-29 RX ADMIN — DOCUSATE SODIUM 100 MG: 100 CAPSULE, LIQUID FILLED ORAL at 08:37

## 2019-09-29 RX ADMIN — Medication 2 PUFF: at 19:50

## 2019-09-29 RX ADMIN — ACETAMINOPHEN 650 MG: 325 TABLET ORAL at 08:51

## 2019-09-29 RX ADMIN — ACETAMINOPHEN 650 MG: 325 TABLET ORAL at 03:38

## 2019-09-29 RX ADMIN — OXYCODONE HYDROCHLORIDE 10 MG: 5 TABLET ORAL at 20:18

## 2019-09-29 RX ADMIN — BACLOFEN 10 MG: 10 TABLET ORAL at 15:58

## 2019-09-29 RX ADMIN — CEFTRIAXONE SODIUM 1 G: 1 INJECTION, POWDER, FOR SOLUTION INTRAMUSCULAR; INTRAVENOUS at 17:22

## 2019-09-29 RX ADMIN — IPRATROPIUM BROMIDE AND ALBUTEROL SULFATE 1 AMPULE: .5; 3 SOLUTION RESPIRATORY (INHALATION) at 19:50

## 2019-09-29 RX ADMIN — TORSEMIDE 20 MG: 20 TABLET ORAL at 12:22

## 2019-09-29 RX ADMIN — Medication 2 PUFF: at 07:15

## 2019-09-29 RX ADMIN — BACLOFEN 10 MG: 10 TABLET ORAL at 06:25

## 2019-09-29 RX ADMIN — DULOXETINE HYDROCHLORIDE 120 MG: 60 CAPSULE, DELAYED RELEASE ORAL at 08:38

## 2019-09-29 RX ADMIN — METHYLPREDNISOLONE SODIUM SUCCINATE 40 MG: 40 INJECTION, POWDER, FOR SOLUTION INTRAMUSCULAR; INTRAVENOUS at 02:23

## 2019-09-29 RX ADMIN — IPRATROPIUM BROMIDE AND ALBUTEROL SULFATE 1 AMPULE: .5; 3 SOLUTION RESPIRATORY (INHALATION) at 10:22

## 2019-09-29 RX ADMIN — OXYCODONE HYDROCHLORIDE 10 MG: 5 TABLET ORAL at 15:58

## 2019-09-29 RX ADMIN — METHIMAZOLE 5 MG: 5 TABLET ORAL at 20:18

## 2019-09-29 RX ADMIN — WARFARIN SODIUM 2.5 MG: 2.5 TABLET ORAL at 18:50

## 2019-09-29 RX ADMIN — OYSTER SHELL CALCIUM WITH VITAMIN D 1 TABLET: 500; 200 TABLET, FILM COATED ORAL at 15:58

## 2019-09-29 RX ADMIN — Medication 10 ML: at 02:23

## 2019-09-29 RX ADMIN — GABAPENTIN 800 MG: 400 CAPSULE ORAL at 20:18

## 2019-09-29 RX ADMIN — Medication 10 ML: at 20:19

## 2019-09-29 RX ADMIN — IPRATROPIUM BROMIDE AND ALBUTEROL SULFATE 1 AMPULE: .5; 3 SOLUTION RESPIRATORY (INHALATION) at 14:42

## 2019-09-29 RX ADMIN — METHYLPREDNISOLONE SODIUM SUCCINATE 40 MG: 40 INJECTION, POWDER, FOR SOLUTION INTRAMUSCULAR; INTRAVENOUS at 15:58

## 2019-09-29 RX ADMIN — Medication 10 ML: at 08:38

## 2019-09-29 RX ADMIN — MAGNESIUM HYDROXIDE 30 ML: 400 SUSPENSION ORAL at 08:39

## 2019-09-29 RX ADMIN — IPRATROPIUM BROMIDE AND ALBUTEROL SULFATE 1 AMPULE: .5; 3 SOLUTION RESPIRATORY (INHALATION) at 07:13

## 2019-09-29 RX ADMIN — OYSTER SHELL CALCIUM WITH VITAMIN D 1 TABLET: 500; 200 TABLET, FILM COATED ORAL at 08:37

## 2019-09-29 RX ADMIN — GABAPENTIN 800 MG: 400 CAPSULE ORAL at 15:58

## 2019-09-29 RX ADMIN — OXYCODONE HYDROCHLORIDE 10 MG: 5 TABLET ORAL at 02:23

## 2019-09-29 RX ADMIN — GABAPENTIN 800 MG: 400 CAPSULE ORAL at 08:37

## 2019-09-29 RX ADMIN — Medication 10 ML: at 17:28

## 2019-09-29 RX ADMIN — CLONAZEPAM 0.5 MG: 0.5 TABLET ORAL at 08:37

## 2019-09-29 RX ADMIN — AZITHROMYCIN DIHYDRATE 500 MG: 500 INJECTION, POWDER, LYOPHILIZED, FOR SOLUTION INTRAVENOUS at 18:50

## 2019-09-29 RX ADMIN — CLONAZEPAM 0.5 MG: 0.5 TABLET ORAL at 20:18

## 2019-09-29 RX ADMIN — DOCUSATE SODIUM 100 MG: 100 CAPSULE, LIQUID FILLED ORAL at 20:18

## 2019-09-29 RX ADMIN — AMIODARONE HYDROCHLORIDE 200 MG: 200 TABLET ORAL at 08:37

## 2019-09-29 ASSESSMENT — PAIN DESCRIPTION - ORIENTATION
ORIENTATION: LOWER;RIGHT

## 2019-09-29 ASSESSMENT — PAIN - FUNCTIONAL ASSESSMENT
PAIN_FUNCTIONAL_ASSESSMENT: PREVENTS OR INTERFERES SOME ACTIVE ACTIVITIES AND ADLS
PAIN_FUNCTIONAL_ASSESSMENT: ACTIVITIES ARE NOT PREVENTED

## 2019-09-29 ASSESSMENT — PAIN DESCRIPTION - LOCATION
LOCATION: BACK;HIP
LOCATION: HIP;BACK
LOCATION: BACK;HIP
LOCATION: HIP;BACK
LOCATION: BACK;HIP
LOCATION: HIP;BACK

## 2019-09-29 ASSESSMENT — PAIN DESCRIPTION - FREQUENCY
FREQUENCY: CONTINUOUS

## 2019-09-29 ASSESSMENT — PAIN DESCRIPTION - DESCRIPTORS
DESCRIPTORS: ACHING;DISCOMFORT

## 2019-09-29 ASSESSMENT — PAIN SCALES - GENERAL
PAINLEVEL_OUTOF10: 7
PAINLEVEL_OUTOF10: 7
PAINLEVEL_OUTOF10: 6
PAINLEVEL_OUTOF10: 8
PAINLEVEL_OUTOF10: 4
PAINLEVEL_OUTOF10: 3

## 2019-09-29 ASSESSMENT — PAIN DESCRIPTION - ONSET
ONSET: ON-GOING

## 2019-09-29 ASSESSMENT — PAIN DESCRIPTION - PAIN TYPE
TYPE: CHRONIC PAIN
TYPE: ACUTE PAIN

## 2019-09-29 ASSESSMENT — PAIN DESCRIPTION - PROGRESSION
CLINICAL_PROGRESSION: GRADUALLY IMPROVING
CLINICAL_PROGRESSION: NOT CHANGED

## 2019-09-30 ENCOUNTER — ANTI-COAG VISIT (OUTPATIENT)
Dept: CARDIOLOGY CLINIC | Age: 54
End: 2019-09-30

## 2019-09-30 PROBLEM — J96.02 ACUTE RESPIRATORY FAILURE WITH HYPOXIA AND HYPERCAPNIA (HCC): Status: ACTIVE | Noted: 2018-03-02

## 2019-09-30 LAB
GLUCOSE BLD-MCNC: 114 MG/DL (ref 70–99)
GLUCOSE BLD-MCNC: 139 MG/DL (ref 70–99)
INR BLD: 2.19 (ref 0.86–1.14)
PERFORMED ON: ABNORMAL
PERFORMED ON: ABNORMAL
PROTHROMBIN TIME: 25 SEC (ref 9.8–13)

## 2019-09-30 PROCEDURE — 1200000000 HC SEMI PRIVATE

## 2019-09-30 PROCEDURE — 6360000002 HC RX W HCPCS: Performed by: HOSPITALIST

## 2019-09-30 PROCEDURE — 85610 PROTHROMBIN TIME: CPT

## 2019-09-30 PROCEDURE — 97116 GAIT TRAINING THERAPY: CPT

## 2019-09-30 PROCEDURE — 94761 N-INVAS EAR/PLS OXIMETRY MLT: CPT

## 2019-09-30 PROCEDURE — 6370000000 HC RX 637 (ALT 250 FOR IP): Performed by: INTERNAL MEDICINE

## 2019-09-30 PROCEDURE — 36415 COLL VENOUS BLD VENIPUNCTURE: CPT

## 2019-09-30 PROCEDURE — 2580000003 HC RX 258: Performed by: HOSPITALIST

## 2019-09-30 PROCEDURE — 97161 PT EVAL LOW COMPLEX 20 MIN: CPT

## 2019-09-30 PROCEDURE — 97166 OT EVAL MOD COMPLEX 45 MIN: CPT

## 2019-09-30 PROCEDURE — 97535 SELF CARE MNGMENT TRAINING: CPT

## 2019-09-30 PROCEDURE — 99232 SBSQ HOSP IP/OBS MODERATE 35: CPT | Performed by: INTERNAL MEDICINE

## 2019-09-30 PROCEDURE — 94640 AIRWAY INHALATION TREATMENT: CPT

## 2019-09-30 PROCEDURE — 6370000000 HC RX 637 (ALT 250 FOR IP): Performed by: HOSPITALIST

## 2019-09-30 PROCEDURE — 94669 MECHANICAL CHEST WALL OSCILL: CPT

## 2019-09-30 PROCEDURE — 97530 THERAPEUTIC ACTIVITIES: CPT

## 2019-09-30 RX ORDER — DILTIAZEM HYDROCHLORIDE 120 MG/1
120 CAPSULE, COATED, EXTENDED RELEASE ORAL DAILY
Status: DISCONTINUED | OUTPATIENT
Start: 2019-09-30 | End: 2019-10-01 | Stop reason: HOSPADM

## 2019-09-30 RX ADMIN — CEFTRIAXONE SODIUM 1 G: 1 INJECTION, POWDER, FOR SOLUTION INTRAMUSCULAR; INTRAVENOUS at 18:04

## 2019-09-30 RX ADMIN — TORSEMIDE 20 MG: 20 TABLET ORAL at 09:06

## 2019-09-30 RX ADMIN — OYSTER SHELL CALCIUM WITH VITAMIN D 1 TABLET: 500; 200 TABLET, FILM COATED ORAL at 09:06

## 2019-09-30 RX ADMIN — OXYCODONE HYDROCHLORIDE 10 MG: 5 TABLET ORAL at 01:04

## 2019-09-30 RX ADMIN — OXYCODONE HYDROCHLORIDE 10 MG: 5 TABLET ORAL at 13:23

## 2019-09-30 RX ADMIN — DIGOXIN 125 MCG: 125 TABLET ORAL at 09:06

## 2019-09-30 RX ADMIN — OYSTER SHELL CALCIUM WITH VITAMIN D 1 TABLET: 500; 200 TABLET, FILM COATED ORAL at 17:00

## 2019-09-30 RX ADMIN — Medication 10 ML: at 20:30

## 2019-09-30 RX ADMIN — DOCUSATE SODIUM 100 MG: 100 CAPSULE, LIQUID FILLED ORAL at 20:30

## 2019-09-30 RX ADMIN — IPRATROPIUM BROMIDE AND ALBUTEROL SULFATE 1 AMPULE: .5; 3 SOLUTION RESPIRATORY (INHALATION) at 15:06

## 2019-09-30 RX ADMIN — GABAPENTIN 800 MG: 400 CAPSULE ORAL at 20:30

## 2019-09-30 RX ADMIN — OXYCODONE HYDROCHLORIDE 10 MG: 5 TABLET ORAL at 05:02

## 2019-09-30 RX ADMIN — OXYCODONE HYDROCHLORIDE 10 MG: 5 TABLET ORAL at 09:15

## 2019-09-30 RX ADMIN — METHYLPREDNISOLONE SODIUM SUCCINATE 40 MG: 40 INJECTION, POWDER, FOR SOLUTION INTRAMUSCULAR; INTRAVENOUS at 05:02

## 2019-09-30 RX ADMIN — BACLOFEN 10 MG: 10 TABLET ORAL at 20:30

## 2019-09-30 RX ADMIN — Medication 2 PUFF: at 19:02

## 2019-09-30 RX ADMIN — ACETAMINOPHEN 650 MG: 325 TABLET ORAL at 05:02

## 2019-09-30 RX ADMIN — BACLOFEN 10 MG: 10 TABLET ORAL at 01:04

## 2019-09-30 RX ADMIN — ACETAMINOPHEN 650 MG: 325 TABLET ORAL at 20:30

## 2019-09-30 RX ADMIN — CLONAZEPAM 0.5 MG: 0.5 TABLET ORAL at 10:28

## 2019-09-30 RX ADMIN — Medication 10 ML: at 09:07

## 2019-09-30 RX ADMIN — Medication 10 ML: at 05:02

## 2019-09-30 RX ADMIN — BACLOFEN 10 MG: 10 TABLET ORAL at 09:15

## 2019-09-30 RX ADMIN — Medication 2 PUFF: at 07:21

## 2019-09-30 RX ADMIN — AMIODARONE HYDROCHLORIDE 200 MG: 200 TABLET ORAL at 09:06

## 2019-09-30 RX ADMIN — DULOXETINE HYDROCHLORIDE 120 MG: 60 CAPSULE, DELAYED RELEASE ORAL at 09:07

## 2019-09-30 RX ADMIN — IPRATROPIUM BROMIDE AND ALBUTEROL SULFATE 1 AMPULE: .5; 3 SOLUTION RESPIRATORY (INHALATION) at 19:02

## 2019-09-30 RX ADMIN — OXYCODONE HYDROCHLORIDE 10 MG: 5 TABLET ORAL at 23:08

## 2019-09-30 RX ADMIN — METHIMAZOLE 5 MG: 5 TABLET ORAL at 20:30

## 2019-09-30 RX ADMIN — GABAPENTIN 800 MG: 400 CAPSULE ORAL at 13:42

## 2019-09-30 RX ADMIN — Medication 10 ML: at 15:30

## 2019-09-30 RX ADMIN — WARFARIN SODIUM 3 MG: 1 TABLET ORAL at 18:35

## 2019-09-30 RX ADMIN — GABAPENTIN 800 MG: 400 CAPSULE ORAL at 09:06

## 2019-09-30 RX ADMIN — AZITHROMYCIN DIHYDRATE 500 MG: 500 INJECTION, POWDER, LYOPHILIZED, FOR SOLUTION INTRAVENOUS at 18:36

## 2019-09-30 RX ADMIN — IPRATROPIUM BROMIDE AND ALBUTEROL SULFATE 1 AMPULE: .5; 3 SOLUTION RESPIRATORY (INHALATION) at 11:09

## 2019-09-30 RX ADMIN — METHYLPREDNISOLONE SODIUM SUCCINATE 40 MG: 40 INJECTION, POWDER, FOR SOLUTION INTRAMUSCULAR; INTRAVENOUS at 15:30

## 2019-09-30 RX ADMIN — CLONAZEPAM 0.5 MG: 0.5 TABLET ORAL at 23:09

## 2019-09-30 RX ADMIN — IPRATROPIUM BROMIDE AND ALBUTEROL SULFATE 1 AMPULE: .5; 3 SOLUTION RESPIRATORY (INHALATION) at 23:12

## 2019-09-30 RX ADMIN — DILTIAZEM HYDROCHLORIDE 120 MG: 120 CAPSULE, COATED, EXTENDED RELEASE ORAL at 11:02

## 2019-09-30 RX ADMIN — OXYCODONE HYDROCHLORIDE 10 MG: 5 TABLET ORAL at 18:12

## 2019-09-30 RX ADMIN — DOCUSATE SODIUM 100 MG: 100 CAPSULE, LIQUID FILLED ORAL at 09:06

## 2019-09-30 RX ADMIN — MAGNESIUM HYDROXIDE 30 ML: 400 SUSPENSION ORAL at 05:14

## 2019-09-30 RX ADMIN — IPRATROPIUM BROMIDE AND ALBUTEROL SULFATE 1 AMPULE: .5; 3 SOLUTION RESPIRATORY (INHALATION) at 07:21

## 2019-09-30 ASSESSMENT — PAIN DESCRIPTION - DESCRIPTORS
DESCRIPTORS: ACHING;DISCOMFORT

## 2019-09-30 ASSESSMENT — PAIN SCALES - GENERAL
PAINLEVEL_OUTOF10: 6
PAINLEVEL_OUTOF10: 6
PAINLEVEL_OUTOF10: 3
PAINLEVEL_OUTOF10: 7
PAINLEVEL_OUTOF10: 6
PAINLEVEL_OUTOF10: 3
PAINLEVEL_OUTOF10: 6
PAINLEVEL_OUTOF10: 8
PAINLEVEL_OUTOF10: 7
PAINLEVEL_OUTOF10: 8

## 2019-09-30 ASSESSMENT — PAIN DESCRIPTION - ONSET
ONSET: ON-GOING

## 2019-09-30 ASSESSMENT — PAIN DESCRIPTION - PAIN TYPE
TYPE: CHRONIC PAIN

## 2019-09-30 ASSESSMENT — PAIN DESCRIPTION - FREQUENCY
FREQUENCY: CONTINUOUS

## 2019-09-30 ASSESSMENT — PAIN DESCRIPTION - ORIENTATION
ORIENTATION: LOWER;RIGHT

## 2019-09-30 ASSESSMENT — PAIN - FUNCTIONAL ASSESSMENT
PAIN_FUNCTIONAL_ASSESSMENT: ACTIVITIES ARE NOT PREVENTED

## 2019-09-30 ASSESSMENT — PAIN DESCRIPTION - LOCATION
LOCATION: HIP;BACK

## 2019-09-30 ASSESSMENT — PAIN DESCRIPTION - PROGRESSION
CLINICAL_PROGRESSION: GRADUALLY IMPROVING
CLINICAL_PROGRESSION: GRADUALLY IMPROVING
CLINICAL_PROGRESSION: NOT CHANGED

## 2019-10-01 ENCOUNTER — TELEPHONE (OUTPATIENT)
Dept: PULMONOLOGY | Age: 54
End: 2019-10-01

## 2019-10-01 VITALS
OXYGEN SATURATION: 92 % | HEIGHT: 66 IN | TEMPERATURE: 98 F | SYSTOLIC BLOOD PRESSURE: 141 MMHG | HEART RATE: 101 BPM | RESPIRATION RATE: 16 BRPM | DIASTOLIC BLOOD PRESSURE: 88 MMHG | WEIGHT: 217 LBS | BODY MASS INDEX: 34.87 KG/M2

## 2019-10-01 LAB
GLUCOSE BLD-MCNC: 116 MG/DL (ref 70–99)
GLUCOSE BLD-MCNC: 133 MG/DL (ref 70–99)
INR BLD: 2.05 (ref 0.86–1.14)
PERFORMED ON: ABNORMAL
PERFORMED ON: ABNORMAL
PROTHROMBIN TIME: 23.4 SEC (ref 9.8–13)

## 2019-10-01 PROCEDURE — 6370000000 HC RX 637 (ALT 250 FOR IP): Performed by: INTERNAL MEDICINE

## 2019-10-01 PROCEDURE — 94640 AIRWAY INHALATION TREATMENT: CPT

## 2019-10-01 PROCEDURE — 85610 PROTHROMBIN TIME: CPT

## 2019-10-01 PROCEDURE — 36415 COLL VENOUS BLD VENIPUNCTURE: CPT

## 2019-10-01 PROCEDURE — 6370000000 HC RX 637 (ALT 250 FOR IP): Performed by: HOSPITALIST

## 2019-10-01 PROCEDURE — 2700000000 HC OXYGEN THERAPY PER DAY

## 2019-10-01 PROCEDURE — 99239 HOSP IP/OBS DSCHRG MGMT >30: CPT | Performed by: INTERNAL MEDICINE

## 2019-10-01 PROCEDURE — 6360000002 HC RX W HCPCS: Performed by: HOSPITALIST

## 2019-10-01 PROCEDURE — 94660 CPAP INITIATION&MGMT: CPT

## 2019-10-01 PROCEDURE — 94761 N-INVAS EAR/PLS OXIMETRY MLT: CPT

## 2019-10-01 PROCEDURE — 2580000003 HC RX 258: Performed by: HOSPITALIST

## 2019-10-01 PROCEDURE — 94669 MECHANICAL CHEST WALL OSCILL: CPT

## 2019-10-01 RX ORDER — METOPROLOL SUCCINATE 50 MG/1
50 TABLET, EXTENDED RELEASE ORAL DAILY
Status: DISCONTINUED | OUTPATIENT
Start: 2019-10-02 | End: 2019-10-01 | Stop reason: HOSPADM

## 2019-10-01 RX ORDER — METOPROLOL SUCCINATE 50 MG/1
50 TABLET, EXTENDED RELEASE ORAL DAILY
Qty: 30 TABLET | Refills: 0 | Status: SHIPPED | OUTPATIENT
Start: 2019-10-02

## 2019-10-01 RX ORDER — CEFDINIR 300 MG/1
300 CAPSULE ORAL 2 TIMES DAILY
Qty: 10 CAPSULE | Refills: 0 | Status: SHIPPED | OUTPATIENT
Start: 2019-10-01 | End: 2019-10-06

## 2019-10-01 RX ORDER — LISINOPRIL 5 MG/1
5 TABLET ORAL DAILY
Qty: 30 TABLET | Refills: 0 | Status: SHIPPED | OUTPATIENT
Start: 2019-10-01

## 2019-10-01 RX ORDER — PREDNISONE 20 MG/1
TABLET ORAL
Qty: 15 TABLET | Refills: 0 | Status: SHIPPED | OUTPATIENT
Start: 2019-10-01

## 2019-10-01 RX ORDER — AZITHROMYCIN 250 MG/1
250 TABLET, FILM COATED ORAL DAILY
Qty: 2 TABLET | Refills: 0 | Status: SHIPPED | OUTPATIENT
Start: 2019-10-01 | End: 2019-10-03

## 2019-10-01 RX ORDER — LISINOPRIL 5 MG/1
5 TABLET ORAL DAILY
Status: DISCONTINUED | OUTPATIENT
Start: 2019-10-01 | End: 2019-10-01 | Stop reason: HOSPADM

## 2019-10-01 RX ADMIN — AMIODARONE HYDROCHLORIDE 200 MG: 200 TABLET ORAL at 08:02

## 2019-10-01 RX ADMIN — IPRATROPIUM BROMIDE AND ALBUTEROL SULFATE 1 AMPULE: .5; 3 SOLUTION RESPIRATORY (INHALATION) at 11:00

## 2019-10-01 RX ADMIN — OYSTER SHELL CALCIUM WITH VITAMIN D 1 TABLET: 500; 200 TABLET, FILM COATED ORAL at 08:02

## 2019-10-01 RX ADMIN — OXYCODONE HYDROCHLORIDE 10 MG: 5 TABLET ORAL at 03:44

## 2019-10-01 RX ADMIN — GABAPENTIN 800 MG: 400 CAPSULE ORAL at 08:02

## 2019-10-01 RX ADMIN — IPRATROPIUM BROMIDE AND ALBUTEROL SULFATE 1 AMPULE: .5; 3 SOLUTION RESPIRATORY (INHALATION) at 15:18

## 2019-10-01 RX ADMIN — BACLOFEN 10 MG: 10 TABLET ORAL at 03:44

## 2019-10-01 RX ADMIN — CLONAZEPAM 0.5 MG: 0.5 TABLET ORAL at 11:10

## 2019-10-01 RX ADMIN — TORSEMIDE 20 MG: 20 TABLET ORAL at 08:02

## 2019-10-01 RX ADMIN — Medication 2 PUFF: at 07:17

## 2019-10-01 RX ADMIN — OXYCODONE HYDROCHLORIDE 10 MG: 5 TABLET ORAL at 11:53

## 2019-10-01 RX ADMIN — DULOXETINE HYDROCHLORIDE 120 MG: 60 CAPSULE, DELAYED RELEASE ORAL at 08:01

## 2019-10-01 RX ADMIN — Medication 10 ML: at 08:02

## 2019-10-01 RX ADMIN — OXYCODONE HYDROCHLORIDE 10 MG: 5 TABLET ORAL at 08:02

## 2019-10-01 RX ADMIN — DOCUSATE SODIUM 100 MG: 100 CAPSULE, LIQUID FILLED ORAL at 08:02

## 2019-10-01 RX ADMIN — IPRATROPIUM BROMIDE AND ALBUTEROL SULFATE 1 AMPULE: .5; 3 SOLUTION RESPIRATORY (INHALATION) at 07:17

## 2019-10-01 RX ADMIN — DIGOXIN 125 MCG: 125 TABLET ORAL at 08:02

## 2019-10-01 RX ADMIN — METHYLPREDNISOLONE SODIUM SUCCINATE 40 MG: 40 INJECTION, POWDER, FOR SOLUTION INTRAMUSCULAR; INTRAVENOUS at 03:44

## 2019-10-01 RX ADMIN — DILTIAZEM HYDROCHLORIDE 120 MG: 120 CAPSULE, COATED, EXTENDED RELEASE ORAL at 08:02

## 2019-10-01 RX ADMIN — OXYCODONE HYDROCHLORIDE 10 MG: 5 TABLET ORAL at 16:22

## 2019-10-01 ASSESSMENT — PAIN DESCRIPTION - LOCATION: LOCATION: HIP;BACK

## 2019-10-01 ASSESSMENT — PAIN DESCRIPTION - DESCRIPTORS: DESCRIPTORS: ACHING;DISCOMFORT

## 2019-10-01 ASSESSMENT — PAIN SCALES - GENERAL
PAINLEVEL_OUTOF10: 7
PAINLEVEL_OUTOF10: 8

## 2019-10-01 ASSESSMENT — PAIN DESCRIPTION - ONSET: ONSET: ON-GOING

## 2019-10-01 ASSESSMENT — PAIN DESCRIPTION - PAIN TYPE: TYPE: CHRONIC PAIN

## 2019-10-01 ASSESSMENT — PAIN DESCRIPTION - FREQUENCY: FREQUENCY: CONTINUOUS

## 2019-10-01 ASSESSMENT — PAIN DESCRIPTION - ORIENTATION: ORIENTATION: LOWER;RIGHT

## 2019-10-01 ASSESSMENT — PAIN - FUNCTIONAL ASSESSMENT: PAIN_FUNCTIONAL_ASSESSMENT: ACTIVITIES ARE NOT PREVENTED

## 2019-10-01 ASSESSMENT — PAIN DESCRIPTION - PROGRESSION: CLINICAL_PROGRESSION: NOT CHANGED

## 2019-10-02 ENCOUNTER — CARE COORDINATION (OUTPATIENT)
Dept: CASE MANAGEMENT | Age: 54
End: 2019-10-02

## 2019-10-02 LAB
BLOOD CULTURE, ROUTINE: NORMAL
CULTURE, BLOOD 2: NORMAL

## 2019-10-08 ENCOUNTER — CARE COORDINATION (OUTPATIENT)
Dept: CASE MANAGEMENT | Age: 54
End: 2019-10-08

## 2019-10-10 ENCOUNTER — CARE COORDINATION (OUTPATIENT)
Dept: CASE MANAGEMENT | Age: 54
End: 2019-10-10

## 2019-10-10 LAB — INR BLD: 1.9

## 2019-10-11 ENCOUNTER — ANTI-COAG VISIT (OUTPATIENT)
Dept: CARDIOLOGY CLINIC | Age: 54
End: 2019-10-11

## 2019-10-16 ENCOUNTER — HOSPITAL ENCOUNTER (OUTPATIENT)
Dept: ULTRASOUND IMAGING | Age: 54
Discharge: HOME | End: 2019-10-16
Payer: MEDICARE

## 2019-10-16 DIAGNOSIS — E05.90 HYPERTHYROIDISM: ICD-10-CM

## 2019-10-16 DIAGNOSIS — E04.2 MULTIPLE THYROID NODULES: ICD-10-CM

## 2019-10-16 PROCEDURE — 76536 US EXAM OF HEAD AND NECK: CPT

## 2019-10-17 ENCOUNTER — OFFICE VISIT (OUTPATIENT)
Dept: ENT CLINIC | Age: 54
End: 2019-10-17
Payer: MEDICARE

## 2019-10-17 VITALS
HEART RATE: 85 BPM | TEMPERATURE: 98.4 F | BODY MASS INDEX: 35.02 KG/M2 | SYSTOLIC BLOOD PRESSURE: 117 MMHG | DIASTOLIC BLOOD PRESSURE: 68 MMHG | HEIGHT: 66 IN

## 2019-10-17 DIAGNOSIS — K11.8 PAROTID MASS: Primary | ICD-10-CM

## 2019-10-17 PROCEDURE — 3017F COLORECTAL CA SCREEN DOC REV: CPT | Performed by: OTOLARYNGOLOGY

## 2019-10-17 PROCEDURE — G8484 FLU IMMUNIZE NO ADMIN: HCPCS | Performed by: OTOLARYNGOLOGY

## 2019-10-17 PROCEDURE — 1111F DSCHRG MED/CURRENT MED MERGE: CPT | Performed by: OTOLARYNGOLOGY

## 2019-10-17 PROCEDURE — 4004F PT TOBACCO SCREEN RCVD TLK: CPT | Performed by: OTOLARYNGOLOGY

## 2019-10-17 PROCEDURE — G8427 DOCREV CUR MEDS BY ELIG CLIN: HCPCS | Performed by: OTOLARYNGOLOGY

## 2019-10-17 PROCEDURE — 10021 FNA BX W/O IMG GDN 1ST LES: CPT | Performed by: OTOLARYNGOLOGY

## 2019-10-17 PROCEDURE — G8417 CALC BMI ABV UP PARAM F/U: HCPCS | Performed by: OTOLARYNGOLOGY

## 2019-10-23 ENCOUNTER — TELEPHONE (OUTPATIENT)
Dept: ENT CLINIC | Age: 54
End: 2019-10-23

## 2019-10-24 ENCOUNTER — ANTI-COAG VISIT (OUTPATIENT)
Dept: CARDIOLOGY CLINIC | Age: 54
End: 2019-10-24

## 2019-10-24 ENCOUNTER — CARE COORDINATION (OUTPATIENT)
Dept: CASE MANAGEMENT | Age: 54
End: 2019-10-24

## 2019-10-24 LAB — INR BLD: 3

## 2019-10-28 RX ORDER — POTASSIUM CHLORIDE 750 MG/1
TABLET, FILM COATED, EXTENDED RELEASE ORAL
Qty: 120 TABLET | Refills: 5 | Status: SHIPPED | OUTPATIENT
Start: 2019-10-28

## 2019-11-14 ENCOUNTER — ANTI-COAG VISIT (OUTPATIENT)
Dept: CARDIOLOGY CLINIC | Age: 54
End: 2019-11-14

## 2019-11-14 ENCOUNTER — CARE COORDINATION (OUTPATIENT)
Dept: CASE MANAGEMENT | Age: 54
End: 2019-11-14

## 2019-11-14 LAB — INR BLD: 2.2

## 2019-11-21 ENCOUNTER — ANTI-COAG VISIT (OUTPATIENT)
Dept: CARDIOLOGY CLINIC | Age: 54
End: 2019-11-21

## 2019-11-21 LAB — INR BLD: 2.6

## 2019-11-29 LAB — INR BLD: 2.1

## 2019-12-02 ENCOUNTER — ANTI-COAG VISIT (OUTPATIENT)
Dept: CARDIOLOGY CLINIC | Age: 54
End: 2019-12-02

## 2019-12-06 ENCOUNTER — ANTI-COAG VISIT (OUTPATIENT)
Dept: CARDIOLOGY CLINIC | Age: 54
End: 2019-12-06

## 2019-12-06 LAB — INR BLD: 1.8

## 2019-12-09 ENCOUNTER — CARE COORDINATION (OUTPATIENT)
Dept: CASE MANAGEMENT | Age: 54
End: 2019-12-09

## 2019-12-12 ENCOUNTER — ANTI-COAG VISIT (OUTPATIENT)
Dept: CARDIOLOGY CLINIC | Age: 54
End: 2019-12-12

## 2019-12-12 ENCOUNTER — TELEPHONE (OUTPATIENT)
Dept: CARDIOLOGY CLINIC | Age: 54
End: 2019-12-12

## 2019-12-12 LAB — INR BLD: 1.9

## 2019-12-20 ENCOUNTER — ANTI-COAG VISIT (OUTPATIENT)
Dept: CARDIOLOGY CLINIC | Age: 54
End: 2019-12-20

## 2019-12-20 LAB — INR BLD: 3.9

## 2019-12-30 ENCOUNTER — CARE COORDINATION (OUTPATIENT)
Dept: CASE MANAGEMENT | Age: 54
End: 2019-12-30

## 2020-01-06 ENCOUNTER — ANTI-COAG VISIT (OUTPATIENT)
Dept: CARDIOLOGY CLINIC | Age: 55
End: 2020-01-06

## 2020-01-06 LAB — INR BLD: 2

## 2020-01-07 ENCOUNTER — TELEPHONE (OUTPATIENT)
Dept: CARDIOLOGY CLINIC | Age: 55
End: 2020-01-07

## 2020-01-30 LAB — INR BLD: 3.5

## 2020-01-31 ENCOUNTER — ANTI-COAG VISIT (OUTPATIENT)
Dept: CARDIOLOGY CLINIC | Age: 55
End: 2020-01-31

## 2020-01-31 ENCOUNTER — TELEPHONE (OUTPATIENT)
Dept: CARDIOLOGY CLINIC | Age: 55
End: 2020-01-31

## 2020-01-31 NOTE — TELEPHONE ENCOUNTER
01/31/20: INR is 3.5. Per protocol, dosing change is hold today then resume normal dosing. Recheck 1 week(s). Provider notified. ~ Patricia Arthur RN .  LMOVM 01/31/20

## 2020-02-06 LAB — INR BLD: 2.5

## 2020-02-07 ENCOUNTER — ANTI-COAG VISIT (OUTPATIENT)
Dept: CARDIOLOGY CLINIC | Age: 55
End: 2020-02-07

## 2020-02-28 LAB — INR BLD: 1.5

## 2020-03-02 ENCOUNTER — ANTI-COAG VISIT (OUTPATIENT)
Dept: CARDIOLOGY CLINIC | Age: 55
End: 2020-03-02

## 2020-03-02 ENCOUNTER — TELEPHONE (OUTPATIENT)
Dept: CARDIOLOGY CLINIC | Age: 55
End: 2020-03-02

## 2020-03-02 NOTE — TELEPHONE ENCOUNTER
03/02/20: INR is 1.5. Per protocol, dosing change is 3.75 mg today then resume normal dosing. Recheck 1 week(s). Provider notified. ~ Lizzette , RN     Patient only wants to be called if OUT OF RANGE.

## 2020-03-04 RX ORDER — AMIODARONE HYDROCHLORIDE 200 MG/1
TABLET ORAL
Qty: 30 TABLET | Refills: 0 | Status: SHIPPED | OUTPATIENT
Start: 2020-03-04 | End: 2020-05-14 | Stop reason: SDUPTHER

## 2020-03-05 ENCOUNTER — ANTI-COAG VISIT (OUTPATIENT)
Dept: CARDIOLOGY CLINIC | Age: 55
End: 2020-03-05

## 2020-03-05 LAB — INR BLD: 3

## 2020-03-20 ENCOUNTER — ANTI-COAG VISIT (OUTPATIENT)
Dept: CARDIOLOGY CLINIC | Age: 55
End: 2020-03-20

## 2020-03-20 LAB
INR BLD: 3.4
INR BLD: 3.4

## 2020-03-23 ENCOUNTER — TELEPHONE (OUTPATIENT)
Dept: CARDIOLOGY CLINIC | Age: 55
End: 2020-03-23

## 2020-03-23 NOTE — TELEPHONE ENCOUNTER
Pt called back. Pt is concerned about the current INR dose. Pt said 2.5 mg daily seems to be too much? RN Malissa Cevallos please advise, pt can be reached at [phone].          03/20/20: INR is 3.4. Per protocol, dosing change is hold today then 2.5 mg daily. Pt was started on macrobid for UTI. Recheck 1 week(s).  Provider notified. ~ Matt Lizarraga RN         TY

## 2020-03-24 ENCOUNTER — ANTI-COAG VISIT (OUTPATIENT)
Dept: CARDIOLOGY CLINIC | Age: 55
End: 2020-03-24

## 2020-03-30 ENCOUNTER — ANTI-COAG VISIT (OUTPATIENT)
Dept: CARDIOLOGY CLINIC | Age: 55
End: 2020-03-30

## 2020-03-30 LAB — INR BLD: 1.5

## 2020-04-03 ENCOUNTER — ANTI-COAG VISIT (OUTPATIENT)
Dept: CARDIOLOGY CLINIC | Age: 55
End: 2020-04-03

## 2020-04-03 LAB — INR BLD: 2.3

## 2020-04-16 LAB — INR BLD: 3.7

## 2020-04-17 ENCOUNTER — ANTI-COAG VISIT (OUTPATIENT)
Dept: CARDIOLOGY CLINIC | Age: 55
End: 2020-04-17

## 2020-04-23 LAB — INR BLD: 4.3

## 2020-04-24 ENCOUNTER — ANTI-COAG VISIT (OUTPATIENT)
Dept: CARDIOLOGY CLINIC | Age: 55
End: 2020-04-24

## 2020-04-24 ENCOUNTER — TELEPHONE (OUTPATIENT)
Dept: CARDIOLOGY CLINIC | Age: 55
End: 2020-04-24

## 2020-04-24 NOTE — TELEPHONE ENCOUNTER
04/24/20: INR is 4.3. Per protocol, dosing change is hold today, decrease tomorrow to half tablet. Recheck on Monday. Provider notified. ~ Tona Wang RN     LM for pt to return call on both available numbers.  4/24/2020

## 2020-04-30 ENCOUNTER — TELEPHONE (OUTPATIENT)
Dept: CARDIOLOGY CLINIC | Age: 55
End: 2020-04-30

## 2020-04-30 ENCOUNTER — ANTI-COAG VISIT (OUTPATIENT)
Dept: CARDIOLOGY CLINIC | Age: 55
End: 2020-04-30

## 2020-04-30 LAB
INR BLD: 5
INR BLD: 5

## 2020-05-01 ENCOUNTER — ANTI-COAG VISIT (OUTPATIENT)
Dept: CARDIOLOGY CLINIC | Age: 55
End: 2020-05-01

## 2020-05-04 ENCOUNTER — ANTI-COAG VISIT (OUTPATIENT)
Dept: CARDIOLOGY CLINIC | Age: 55
End: 2020-05-04
Payer: MEDICARE

## 2020-05-04 LAB — INR BLD: 2.1

## 2020-05-04 PROCEDURE — 93793 ANTICOAG MGMT PT WARFARIN: CPT | Performed by: NURSE PRACTITIONER

## 2020-05-12 ENCOUNTER — ANTI-COAG VISIT (OUTPATIENT)
Dept: CARDIOLOGY CLINIC | Age: 55
End: 2020-05-12
Payer: MEDICARE

## 2020-05-12 LAB — INR BLD: 2.9

## 2020-05-12 PROCEDURE — 93793 ANTICOAG MGMT PT WARFARIN: CPT | Performed by: NURSE PRACTITIONER

## 2020-05-14 ENCOUNTER — TELEPHONE (OUTPATIENT)
Dept: CARDIOLOGY CLINIC | Age: 55
End: 2020-05-14

## 2020-05-14 RX ORDER — AMIODARONE HYDROCHLORIDE 200 MG/1
TABLET ORAL
Qty: 7 TABLET | Refills: 0 | Status: SHIPPED | OUTPATIENT
Start: 2020-05-14 | End: 2020-08-31 | Stop reason: ALTCHOICE

## 2020-05-14 NOTE — TELEPHONE ENCOUNTER
She really needs to be seen. I am OK waiting 4 weeks for OV. But, she needs labs done ASAP. She can have 7 days of amiodarone until labs are done.

## 2020-05-14 NOTE — TELEPHONE ENCOUNTER
Spoke with pt. Her PCP is attempting to arrange [facility] to draw labs as they do not want her to go out at this time. Labs have not been done since 9/2019. Last OV 7/2019, instructed to follow up in 6 months from that time. Pt would like to know how far out she can make her apt to see if the current pandemic eases some. She would also like to know if you are willing to refill until she can get labs and OV done.

## 2020-05-19 ENCOUNTER — TELEPHONE (OUTPATIENT)
Dept: CARDIOLOGY CLINIC | Age: 55
End: 2020-05-19

## 2020-05-19 ENCOUNTER — ANTI-COAG VISIT (OUTPATIENT)
Dept: CARDIOLOGY CLINIC | Age: 55
End: 2020-05-19
Payer: MEDICARE

## 2020-05-19 ENCOUNTER — ANTI-COAG VISIT (OUTPATIENT)
Dept: CARDIOLOGY CLINIC | Age: 55
End: 2020-05-19

## 2020-05-19 LAB
INR BLD: 2.3
INR BLD: 2.3

## 2020-05-19 PROCEDURE — 93793 ANTICOAG MGMT PT WARFARIN: CPT | Performed by: NURSE PRACTITIONER

## 2020-06-01 LAB — INR BLD: 3.6

## 2020-06-02 ENCOUNTER — ANTI-COAG VISIT (OUTPATIENT)
Dept: CARDIOLOGY CLINIC | Age: 55
End: 2020-06-02

## 2020-06-08 LAB — INR BLD: 3

## 2020-06-10 ENCOUNTER — ANTI-COAG VISIT (OUTPATIENT)
Dept: CARDIOLOGY CLINIC | Age: 55
End: 2020-06-10
Payer: MEDICARE

## 2020-06-10 PROCEDURE — 93793 ANTICOAG MGMT PT WARFARIN: CPT | Performed by: NURSE PRACTITIONER

## 2020-06-17 ENCOUNTER — ANTI-COAG VISIT (OUTPATIENT)
Dept: CARDIOLOGY CLINIC | Age: 55
End: 2020-06-17
Payer: MEDICARE

## 2020-06-17 LAB — INR BLD: 2.2

## 2020-06-17 PROCEDURE — 93793 ANTICOAG MGMT PT WARFARIN: CPT | Performed by: NURSE PRACTITIONER

## 2020-06-18 ENCOUNTER — TELEPHONE (OUTPATIENT)
Dept: CARDIOLOGY CLINIC | Age: 55
End: 2020-06-18

## 2020-06-18 NOTE — TELEPHONE ENCOUNTER
Patient called back. States she is having no issues. States she is needing her amiodarone because she has been out now for over 1 week. States she is aware she needs and appt and bloodwork. States she is terrified to come out. I explained the importance of obtaining blood work and an in person visit due to needing an EKG. States she understands that. Gave her [address] that are close to her to have blood work. Explained that we are taking all necessary precautions. She will schedule an appt when we call her back. Some of your slots are blocked. ...do you want her put in any of those? If not its more toward the end of July. Patient doesn't feel she should be without her medication that long. Please advise on refill and appt.

## 2020-07-02 LAB — INR BLD: 3.7

## 2020-07-06 ENCOUNTER — ANTI-COAG VISIT (OUTPATIENT)
Dept: CARDIOLOGY CLINIC | Age: 55
End: 2020-07-06
Payer: MEDICARE

## 2020-07-06 PROCEDURE — 93793 ANTICOAG MGMT PT WARFARIN: CPT | Performed by: NURSE PRACTITIONER

## 2020-07-20 ENCOUNTER — TELEPHONE (OUTPATIENT)
Dept: CARDIOLOGY CLINIC | Age: 55
End: 2020-07-20

## 2020-07-20 ENCOUNTER — ANTI-COAG VISIT (OUTPATIENT)
Dept: CARDIOLOGY CLINIC | Age: 55
End: 2020-07-20
Payer: MEDICARE

## 2020-07-20 LAB — INR BLD: 1.7

## 2020-07-20 PROCEDURE — 93793 ANTICOAG MGMT PT WARFARIN: CPT | Performed by: NURSE PRACTITIONER

## 2020-07-24 LAB — INR BLD: 1.7

## 2020-07-24 PROCEDURE — 93793 ANTICOAG MGMT PT WARFARIN: CPT | Performed by: NURSE PRACTITIONER

## 2020-07-27 ENCOUNTER — ANTI-COAG VISIT (OUTPATIENT)
Dept: CARDIOLOGY CLINIC | Age: 55
End: 2020-07-27
Payer: MEDICARE

## 2020-07-27 ENCOUNTER — TELEPHONE (OUTPATIENT)
Dept: CARDIOLOGY CLINIC | Age: 55
End: 2020-07-27

## 2020-07-27 NOTE — TELEPHONE ENCOUNTER
LMOVM INR is 1.7. Per protocol, dosing change is 2.5 mg daily. Recheck 1 week(s).  Provider notified. ~ Aishwarya Johns RN

## 2020-08-03 LAB — INR BLD: 3.2

## 2020-08-04 ENCOUNTER — TELEPHONE (OUTPATIENT)
Dept: CARDIOLOGY CLINIC | Age: 55
End: 2020-08-04

## 2020-08-04 ENCOUNTER — ANTI-COAG VISIT (OUTPATIENT)
Dept: CARDIOLOGY CLINIC | Age: 55
End: 2020-08-04
Payer: MEDICARE

## 2020-08-04 PROCEDURE — 93793 ANTICOAG MGMT PT WARFARIN: CPT | Performed by: NURSE PRACTITIONER

## 2020-08-04 NOTE — TELEPHONE ENCOUNTER
08/03/20: INR is 3.2. Per protocol, dosing change is 1.25 mg today, normal dosing all other days. Recheck 1 week(s). Provider notified. ~ Chio Shepherd RN     [facility] for pt to return call.  8/4/2020

## 2020-08-06 ENCOUNTER — HOSPITAL ENCOUNTER (OUTPATIENT)
Age: 55
Discharge: HOME | End: 2020-08-06
Payer: MEDICARE

## 2020-08-06 ENCOUNTER — TELEPHONE (OUTPATIENT)
Dept: CARDIOLOGY CLINIC | Age: 55
End: 2020-08-06

## 2020-08-06 LAB
ALBUMIN SERPL-MCNC: 3.6 G/DL (ref 3.4–5)
ALP BLD-CCNC: 106 U/L (ref 40–129)
ALT SERPL-CCNC: <5 U/L (ref 10–40)
ANION GAP SERPL CALCULATED.3IONS-SCNC: 12 MMOL/L (ref 3–16)
AST SERPL-CCNC: 15 U/L (ref 15–37)
BILIRUB SERPL-MCNC: 0.3 MG/DL (ref 0–1)
BILIRUBIN DIRECT: <0.2 MG/DL (ref 0–0.3)
BILIRUBIN, INDIRECT: ABNORMAL MG/DL (ref 0–1)
BUN BLDV-MCNC: 9 MG/DL (ref 7–20)
CALCIUM SERPL-MCNC: 8.9 MG/DL (ref 8.3–10.6)
CHLORIDE BLD-SCNC: 105 MMOL/L (ref 99–110)
CO2: 22 MMOL/L (ref 21–32)
CREAT SERPL-MCNC: 1 MG/DL (ref 0.6–1.1)
GFR AFRICAN AMERICAN: >60
GFR NON-AFRICAN AMERICAN: 57
GLUCOSE BLD-MCNC: 92 MG/DL (ref 70–99)
POTASSIUM SERPL-SCNC: 4.2 MMOL/L (ref 3.5–5.1)
SODIUM BLD-SCNC: 139 MMOL/L (ref 136–145)
TOTAL PROTEIN: 7.7 G/DL (ref 6.4–8.2)
TSH REFLEX FT4: 1.71 UIU/ML (ref 0.27–4.2)

## 2020-08-06 PROCEDURE — 36415 COLL VENOUS BLD VENIPUNCTURE: CPT

## 2020-08-06 PROCEDURE — 84443 ASSAY THYROID STIM HORMONE: CPT

## 2020-08-06 PROCEDURE — 80076 HEPATIC FUNCTION PANEL: CPT

## 2020-08-06 PROCEDURE — 80048 BASIC METABOLIC PNL TOTAL CA: CPT

## 2020-08-06 NOTE — TELEPHONE ENCOUNTER
Pt had bloodwork done today per NPBB. She is requesting results to be faxed to her PCP [address] fax: [phone], and to call the patient as well.

## 2020-08-07 ENCOUNTER — TELEPHONE (OUTPATIENT)
Dept: CARDIOLOGY CLINIC | Age: 55
End: 2020-08-07

## 2020-08-07 NOTE — TELEPHONE ENCOUNTER
Created telephone encounter. Per Pt HIPAA from can leave results on machine. LMOM relaying message per NPBB. Pt to call the office with any concerns and to make an appt. Willie Acostas

## 2020-08-07 NOTE — TELEPHONE ENCOUNTER
Lab results have been faxed to pt's PCP office. Another encounter created by NATHAN Jackson and BARON about results.

## 2020-08-07 NOTE — TELEPHONE ENCOUNTER
----- Message from DEE Hazel CNP sent at 8/7/2020  8:21 AM EDT -----  Please inform the patient that her labs are normal. She has not been seen in over a year and will need an appointment for further refills and if she wants our office to continue managing Coumadin.

## 2020-08-24 ENCOUNTER — ANTI-COAG VISIT (OUTPATIENT)
Dept: CARDIOLOGY CLINIC | Age: 55
End: 2020-08-24
Payer: MEDICARE

## 2020-08-24 ENCOUNTER — TELEPHONE (OUTPATIENT)
Dept: CARDIOLOGY CLINIC | Age: 55
End: 2020-08-24

## 2020-08-24 LAB — INR BLD: 3.6

## 2020-08-24 PROCEDURE — 93793 ANTICOAG MGMT PT WARFARIN: CPT | Performed by: NURSE PRACTITIONER

## 2020-08-24 NOTE — TELEPHONE ENCOUNTER
08/24/20: INR is 3.6. Per protocol, dosing change is 1.25 mg today, normal dosing all other days. Recheck this Friday. Provider notified. ~ Sherren Schuller, RN     [facility] for pt to return call. 8/24/2020. Pt also needs to schedule apt if she wants our office to continue to manage INR. Has not been seen in >1 year.

## 2020-08-25 ENCOUNTER — TELEPHONE (OUTPATIENT)
Dept: CARDIOLOGY CLINIC | Age: 55
End: 2020-08-25

## 2020-08-25 NOTE — TELEPHONE ENCOUNTER
Letter mailed to pt, per NPBB, advising her to contact PCP for management of INR or referral to coumadin clinic as we have not been able to reach pt to schedule appropriate f/u apt or to discuss her dosing.

## 2020-08-25 NOTE — TELEPHONE ENCOUNTER
----- Message from DEE See CNP sent at 8/25/2020  8:11 AM EDT -----  Yes please and thx  ----- Message -----  From: Ventura Adrian RN  Sent: 8/24/2020   4:59 PM EDT  To: DEE See CNP,     Do you want us to send a letter to the pt referring her to the coumadin clinic or her PCP for management of her INR? Has not been seen in > 1 year and our office has made multiple attempts to contact her for at least 1 month.

## 2020-08-31 ENCOUNTER — OFFICE VISIT (OUTPATIENT)
Dept: CARDIOLOGY CLINIC | Age: 55
End: 2020-08-31
Payer: MEDICARE

## 2020-08-31 VITALS
DIASTOLIC BLOOD PRESSURE: 88 MMHG | WEIGHT: 196 LBS | OXYGEN SATURATION: 94 % | HEIGHT: 69 IN | HEART RATE: 88 BPM | SYSTOLIC BLOOD PRESSURE: 134 MMHG | BODY MASS INDEX: 29.03 KG/M2

## 2020-08-31 PROBLEM — I48.11 LONGSTANDING PERSISTENT ATRIAL FIBRILLATION (HCC): Status: ACTIVE | Noted: 2018-09-26

## 2020-08-31 LAB — INR BLD: 2.7

## 2020-08-31 PROCEDURE — 99214 OFFICE O/P EST MOD 30 MIN: CPT | Performed by: NURSE PRACTITIONER

## 2020-08-31 PROCEDURE — 93000 ELECTROCARDIOGRAM COMPLETE: CPT | Performed by: NURSE PRACTITIONER

## 2020-08-31 NOTE — PROGRESS NOTES
[address]   Electrophysiology Note              Date:  August 31, 2020  Patient name: Rolando Lou  YOB: 1965    Primary Care physician: Gerardo Scanlon. Karen Falcon MD, MD    HISTORY OF PRESENT ILLNESS: The patient is a 60 y.o.  female with a history of atrial fibrillation, atrial flutter, HTN, NICM (resolved), mitral regurgitation, chronic pain, hyperthyroidism, LOTTIE, COPD, pulmonary nodules, and tobacco abuse. EF on cardiac MRI in 9/2013 was 41%. She had a failed afib ablation in 2014 at [facility]. EF remained 40-45% on echo in 11/2016. She was admitted in 3/2018 with a COPD exacerbation and was in atrial tachycardia. She was started on sotalol but was switched to amiodarone due to wheezing. Echo in 3/2018 showed an EF of 50-55%. At her visit in 9/2018 she was referred to endocrinology due to amiodarone use and hyperthyroidism. In 11/2018 she had a bronchoscopy and biopsies done for mediastinal lymphadenopathy (negative). She was hospitalized in 9/2019 with pneumonia and her rhythm was AT. Amiodarone was stopped in 6/2020 due to lack of follow up. Today she is being seen for atrial fibrillation. EKG shows afib versus AT with a HR of 91. She complains of chronic SOB, chronic fatigue, right hip pain, and lower extremity weakness. Denies chest pain, palpitations, and dizziness. Needs right hip replacement. Past Medical History:   has a past medical history of A-fib ([address] CHF (congestive heart failure) ([address] COPD (chronic obstructive pulmonary disease) ([address] HTN (hypertension), Hyperlipidemia, Hyperthyroidism, Pneumonia due to organism, Recurrent upper respiratory infection (URI), Sleep apnea, and Tinnitus. Past Surgical History:   has a past surgical history that includes Leg Surgery; Hysterectomy; Cardiac surgery;  Hysterectomy, total abdominal; Arm Surgery (Right); [facility]/2 node station/strux (N/A, 11/16/2018); pr brnchsc w/brncl alveolar lavage (11/16/2018); pr bronchoscopy w/transbronchial lung bx 1 lobe (11/16/2018); and pr bronchoscopy w/transbronchial lung bx 1 lobe (11/16/2018). Home Medications:    Prior to Admission medications    Medication Sig Start Date End Date Taking? Authorizing Provider   potassium chloride (KLOR-CON) 10 MEQ extended release tablet TAKE 2 TABLETS BY MOUTH TWICE DAILY 10/28/19  Yes DEE Harrison CNP   BREO ELLIPTA 200-25 MCG/INH AEPB inhaler INHALE 1 PUFF INTO THE LUNGS DAILY 10/11/19  Yes Dwight Queen MD   lisinopril (PRINIVIL;ZESTRIL) 5 MG tablet Take 1 tablet by mouth daily 10/1/19  Yes Aristeo Phillip MD   metoprolol succinate (TOPROL XL) 50 MG extended release tablet Take 1 tablet by mouth daily 10/2/19  Yes Aristeo Phillip MD   digoxin (LANOXIN) 125 MCG tablet Take 1 tablet by mouth daily 9/9/19  Yes DEE Harrison CNP   DULoxetine (CYMBALTA) 60 MG extended release capsule Take 120 mg by mouth 9/25/18  Yes Historical Provider, MD   oxyCODONE (ROXICODONE) 5 MG immediate release tablet Take 10 mg by mouth 2 times daily. Yes Historical Provider, MD   ezetimibe (ZETIA) 10 MG tablet Take 10 mg by mouth daily   Yes Historical Provider, MD   torsemide (DEMADEX) 20 MG tablet Take 20 mg by mouth daily   Yes Historical Provider, MD   baclofen (LIORESAL) 10 MG tablet Take 10 mg by mouth 3 times daily   Yes Historical Provider, MD   Calcium Carb-Cholecalciferol (CALCIUM-VITAMIN D) 500-200 MG-UNIT per tablet Take 1 tablet by mouth 2 times daily (with meals)   Yes Historical Provider, MD   clonazePAM (KLONOPIN) 1 MG tablet Take 1 mg by mouth 2 times daily as needed. Yes Historical Provider, MD   diclofenac sodium 1 % GEL Apply 2 g topically 2 times daily   Yes Historical Provider, MD   docusate sodium (COLACE) 100 MG capsule Take 100 mg by mouth 2 times daily   Yes Historical Provider, MD   gabapentin (NEURONTIN) 400 MG capsule Take 800 mg by mouth 3 times daily.    Yes Historical use of accessory muscles  · Resp auscultation: Diminished breath sounds; no wheezing/rhonchi/rales  Cardiovascular:  · The apical impulse is not displaced  · Heart tones are crisp and normal. Irregular S1 and S2.  · Jugular venous pulsation Normal  · The carotid upstroke is normal in amplitude and contour without delay or bruit  · Peripheral pulses are symmetrical and full   Abdomen:  · No masses or tenderness  · Bowel sounds present  Extremities:  ·  No cyanosis or clubbing  ·  Mild swelling of the right leg (chronic per patient)  ·  Skin: warm and dry  Neurological:  · Alert and oriented  · Moves all extremities well  · Anxious appearing, irritable     DATA:    ECG 8/31/2020: Afib HR 91    Echo 3/3/2018:  Technically difficult study. Normal left ventricular size, wall thickness and wall motion with an estimated ejection fraction of 50-55 %.   Left atrium is moderately dilated.   Mild to moderate mitral regurgitation is present. CARDIOLOGY LABS:   CBC: No results for input(s): WBC, HGB, HCT, PLT in the last 72 hours. BMP: No results for input(s): NA, K, CO2, BUN, CREATININE, LABGLOM, GLUCOSE in the last 72 hours. PT/INR: No results for input(s): PROTIME, INR in the last 72 hours. APTT:No results for input(s): APTT in the last 72 hours. FASTING LIPID PANEL:No results found for: HDL, LDLDIRECT, LDLCALC, TRIG  LIVER PROFILE:No results for input(s): AST, ALT, ALB in the last 72 hours. Assessment:   1. Persistent atrial arrhythmias: stable   -history of afib and AT (currently asymptomatic)   -s/p failed afib ablation in 2014 at [facility]   -intolerant of sotalol due to wheezing   -amiodarone started in 3/2018, stopped 6/2020 due to noncompliance with follow up  2. HTN: controlled   3. History of NICM: stable   -EF improved to 40-45%  4. Mitral regurgitation: mild to moderate on echo in 3/2018  5. Chronic pain: followed by pain clinic at   6. Hyperthyroidism: on Tapazole    -followed by Dr. Gallito Morales  7.  LOTTIE:

## 2020-08-31 NOTE — PATIENT INSTRUCTIONS
No changes   Monitor heart rate at home and call if consistently out of goal ranges (goal is )  Digoxin level is due in September  Follow up in 6 months

## 2020-09-02 ENCOUNTER — ANTI-COAG VISIT (OUTPATIENT)
Dept: CARDIOLOGY CLINIC | Age: 55
End: 2020-09-02
Payer: MEDICARE

## 2020-09-02 PROCEDURE — 93793 ANTICOAG MGMT PT WARFARIN: CPT | Performed by: NURSE PRACTITIONER

## 2020-09-08 ENCOUNTER — ANTI-COAG VISIT (OUTPATIENT)
Dept: CARDIOLOGY CLINIC | Age: 55
End: 2020-09-08
Payer: MEDICARE

## 2020-09-08 LAB — INR BLD: 2

## 2020-09-08 PROCEDURE — 93793 ANTICOAG MGMT PT WARFARIN: CPT | Performed by: NURSE PRACTITIONER

## 2020-09-15 LAB — INR BLD: 3.4

## 2020-09-16 ENCOUNTER — ANTI-COAG VISIT (OUTPATIENT)
Dept: CARDIOLOGY CLINIC | Age: 55
End: 2020-09-16
Payer: MEDICARE

## 2020-09-16 PROCEDURE — 93793 ANTICOAG MGMT PT WARFARIN: CPT | Performed by: NURSE PRACTITIONER

## 2020-09-21 LAB — INR BLD: 2.7

## 2020-09-22 ENCOUNTER — ANTI-COAG VISIT (OUTPATIENT)
Dept: CARDIOLOGY CLINIC | Age: 55
End: 2020-09-22
Payer: MEDICARE

## 2020-09-22 ENCOUNTER — TELEPHONE (OUTPATIENT)
Dept: CARDIOLOGY CLINIC | Age: 55
End: 2020-09-22

## 2020-09-22 PROCEDURE — 93793 ANTICOAG MGMT PT WARFARIN: CPT | Performed by: NURSE PRACTITIONER

## 2020-09-22 RX ORDER — DIGOXIN 125 MCG
125 TABLET ORAL DAILY
Qty: 90 TABLET | Refills: 0 | Status: SHIPPED | OUTPATIENT
Start: 2020-09-22 | End: 2021-01-11

## 2020-09-22 NOTE — TELEPHONE ENCOUNTER
Please reach out to pt to let her know that she needs to get Digoxin level done as ordered at her last OV. Need this for further refills.

## 2020-09-28 LAB — INR BLD: 2.9

## 2020-09-29 ENCOUNTER — ANTI-COAG VISIT (OUTPATIENT)
Dept: CARDIOLOGY CLINIC | Age: 55
End: 2020-09-29
Payer: MEDICARE

## 2020-09-29 PROCEDURE — 93793 ANTICOAG MGMT PT WARFARIN: CPT | Performed by: NURSE PRACTITIONER

## 2020-10-05 LAB — INR BLD: 3.5

## 2020-10-05 PROCEDURE — 93793 ANTICOAG MGMT PT WARFARIN: CPT | Performed by: NURSE PRACTITIONER

## 2020-10-07 ENCOUNTER — ANTI-COAG VISIT (OUTPATIENT)
Dept: CARDIOLOGY CLINIC | Age: 55
End: 2020-10-07
Payer: MEDICARE

## 2020-10-12 LAB — INR BLD: 2.6

## 2020-10-12 PROCEDURE — 93793 ANTICOAG MGMT PT WARFARIN: CPT | Performed by: NURSE PRACTITIONER

## 2020-10-14 ENCOUNTER — ANTI-COAG VISIT (OUTPATIENT)
Dept: CARDIOLOGY CLINIC | Age: 55
End: 2020-10-14
Payer: MEDICARE

## 2020-11-02 LAB — INR BLD: 3.8

## 2020-11-03 ENCOUNTER — TELEPHONE (OUTPATIENT)
Dept: CARDIOLOGY CLINIC | Age: 55
End: 2020-11-03

## 2020-11-03 ENCOUNTER — ANTI-COAG VISIT (OUTPATIENT)
Dept: CARDIOLOGY CLINIC | Age: 55
End: 2020-11-03
Payer: MEDICARE

## 2020-11-03 PROCEDURE — 93793 ANTICOAG MGMT PT WARFARIN: CPT | Performed by: NURSE PRACTITIONER

## 2020-11-03 NOTE — TELEPHONE ENCOUNTER
11/2/20: INR is 3.8. Per protocol, Decrease tonight's dose to 1.25 mg, normal dosing all other days. Recheck 1 week(s). Provider notified. ~ Angela Mendes RN     11/3/20 left message for pt to return call.

## 2020-11-30 LAB — INR BLD: 2.5

## 2020-12-01 ENCOUNTER — ANTI-COAG VISIT (OUTPATIENT)
Dept: CARDIOLOGY CLINIC | Age: 55
End: 2020-12-01
Payer: MEDICARE

## 2020-12-01 PROCEDURE — 93793 ANTICOAG MGMT PT WARFARIN: CPT | Performed by: NURSE PRACTITIONER

## 2020-12-04 ENCOUNTER — TELEPHONE (OUTPATIENT)
Dept: CARDIOLOGY CLINIC | Age: 55
End: 2020-12-04

## 2020-12-04 NOTE — TELEPHONE ENCOUNTER
----- Message from DEE Almaraz CNP sent at 12/1/2020  1:49 PM EST -----  Aurora Matthews,   She needs an annual CBC due to Coumadin. Can you please see if she's had recent labs with her PCP? She also seems to be due for an annual digoxin level. BMP looks up to date. Thank you!     [employer]

## 2020-12-04 NOTE — TELEPHONE ENCOUNTER
LMOVM relaying message to get CBC and digoxin level per [facility]. Labs ordered.

## 2020-12-15 LAB — INR BLD: 3.3

## 2020-12-16 ENCOUNTER — ANTI-COAG VISIT (OUTPATIENT)
Dept: CARDIOLOGY CLINIC | Age: 55
End: 2020-12-16
Payer: MEDICARE

## 2020-12-16 ENCOUNTER — TELEPHONE (OUTPATIENT)
Dept: CARDIOLOGY CLINIC | Age: 55
End: 2020-12-16

## 2020-12-16 PROCEDURE — 93793 ANTICOAG MGMT PT WARFARIN: CPT | Performed by: NURSE PRACTITIONER

## 2020-12-16 NOTE — TELEPHONE ENCOUNTER
LMOVM INR is 3.3. Per protocol, dosing change is 1.25 mg today then resume normal dosing of 2.5 mg daily. Recheck 1 week(s).  Provider notified. ~ Sanna Hunter RN

## 2021-01-11 DIAGNOSIS — I48.0 PAROXYSMAL ATRIAL FIBRILLATION (HCC): ICD-10-CM

## 2021-01-12 RX ORDER — DIGOXIN 125 MCG
125 TABLET ORAL DAILY
Qty: 30 TABLET | Refills: 0 | Status: SHIPPED | OUTPATIENT
Start: 2021-01-12 | End: 2021-02-03 | Stop reason: SDUPTHER

## 2021-01-25 ENCOUNTER — TELEPHONE (OUTPATIENT)
Dept: CARDIOLOGY CLINIC | Age: 56
End: 2021-01-25

## 2021-01-25 ENCOUNTER — ANTI-COAG VISIT (OUTPATIENT)
Dept: CARDIOLOGY CLINIC | Age: 56
End: 2021-01-25
Payer: MEDICARE

## 2021-01-25 DIAGNOSIS — I48.11 LONGSTANDING PERSISTENT ATRIAL FIBRILLATION (HCC): ICD-10-CM

## 2021-01-25 LAB — INR BLD: 4.5

## 2021-01-25 PROCEDURE — 93793 ANTICOAG MGMT PT WARFARIN: CPT | Performed by: NURSE PRACTITIONER

## 2021-02-01 LAB — INR BLD: 1.8

## 2021-02-01 PROCEDURE — 93793 ANTICOAG MGMT PT WARFARIN: CPT | Performed by: NURSE PRACTITIONER

## 2021-02-02 ENCOUNTER — HOSPITAL ENCOUNTER (OUTPATIENT)
Age: 56
Discharge: HOME | End: 2021-02-02
Payer: MEDICARE

## 2021-02-02 ENCOUNTER — ANTI-COAG VISIT (OUTPATIENT)
Dept: CARDIOLOGY CLINIC | Age: 56
End: 2021-02-02
Payer: MEDICARE

## 2021-02-02 ENCOUNTER — TELEPHONE (OUTPATIENT)
Dept: CARDIOLOGY CLINIC | Age: 56
End: 2021-02-02

## 2021-02-02 DIAGNOSIS — I48.11 LONGSTANDING PERSISTENT ATRIAL FIBRILLATION (HCC): ICD-10-CM

## 2021-02-02 DIAGNOSIS — I47.1 ATRIAL TACHYCARDIA (HCC): ICD-10-CM

## 2021-02-02 PROCEDURE — 85025 COMPLETE CBC W/AUTO DIFF WBC: CPT

## 2021-02-02 PROCEDURE — 36415 COLL VENOUS BLD VENIPUNCTURE: CPT

## 2021-02-02 PROCEDURE — 80162 ASSAY OF DIGOXIN TOTAL: CPT

## 2021-02-03 ENCOUNTER — TELEPHONE (OUTPATIENT)
Dept: CARDIOLOGY CLINIC | Age: 56
End: 2021-02-03

## 2021-02-03 DIAGNOSIS — I47.1 ATRIAL TACHYCARDIA (HCC): Primary | ICD-10-CM

## 2021-02-03 DIAGNOSIS — I48.0 PAROXYSMAL ATRIAL FIBRILLATION (HCC): ICD-10-CM

## 2021-02-03 DIAGNOSIS — I47.19 ATRIAL TACHYCARDIA: ICD-10-CM

## 2021-02-03 LAB
ANION GAP SERPL CALCULATED.3IONS-SCNC: 14 MMOL/L (ref 3–16)
BASOPHILS ABSOLUTE: 0.1 K/UL (ref 0–0.2)
BASOPHILS RELATIVE PERCENT: 0.8 %
BUN BLDV-MCNC: 10 MG/DL (ref 7–20)
CALCIUM SERPL-MCNC: 9 MG/DL (ref 8.3–10.6)
CHLORIDE BLD-SCNC: 102 MMOL/L (ref 99–110)
CO2: 21 MMOL/L (ref 21–32)
CREAT SERPL-MCNC: 0.9 MG/DL (ref 0.6–1.1)
DIGOXIN LEVEL: 0.8 NG/ML (ref 0.8–2)
EOSINOPHILS ABSOLUTE: 0.2 K/UL (ref 0–0.6)
EOSINOPHILS RELATIVE PERCENT: 3.2 %
GFR AFRICAN AMERICAN: >60
GFR NON-AFRICAN AMERICAN: >60
GLUCOSE BLD-MCNC: 70 MG/DL (ref 70–99)
HCT VFR BLD CALC: 39.1 % (ref 36–48)
HEMOGLOBIN: 12.5 G/DL (ref 12–16)
LYMPHOCYTES ABSOLUTE: 1.6 K/UL (ref 1–5.1)
LYMPHOCYTES RELATIVE PERCENT: 21.3 %
MCH RBC QN AUTO: 26.9 PG (ref 26–34)
MCHC RBC AUTO-ENTMCNC: 32 G/DL (ref 31–36)
MCV RBC AUTO: 84.1 FL (ref 80–100)
MONOCYTES ABSOLUTE: 0.5 K/UL (ref 0–1.3)
MONOCYTES RELATIVE PERCENT: 7 %
NEUTROPHILS ABSOLUTE: 5 K/UL (ref 1.7–7.7)
NEUTROPHILS RELATIVE PERCENT: 67.7 %
PDW BLD-RTO: 16.5 % (ref 12.4–15.4)
PLATELET # BLD: 346 K/UL (ref 135–450)
PMV BLD AUTO: 7.8 FL (ref 5–10.5)
POTASSIUM SERPL-SCNC: 5 MMOL/L (ref 3.5–5.1)
RBC # BLD: 4.65 M/UL (ref 4–5.2)
SODIUM BLD-SCNC: 137 MMOL/L (ref 136–145)
WBC # BLD: 7.5 K/UL (ref 4–11)

## 2021-02-03 RX ORDER — DIGOXIN 125 MCG
125 TABLET ORAL DAILY
Qty: 30 TABLET | Refills: 5 | Status: SHIPPED | OUTPATIENT
Start: 2021-02-03 | End: 2021-02-26 | Stop reason: SDUPTHER

## 2021-02-03 NOTE — TELEPHONE ENCOUNTER
Pt wants to know if you can order labs to see how her kidneys are doing?  She states she discussed that with you and there was no order for that when she had labs completed,

## 2021-02-03 NOTE — TELEPHONE ENCOUNTER
----- Message from DEE Garza CNP sent at 2/3/2021 10:41 AM EST -----  Please inform the patient that her CBC is normal.

## 2021-02-03 NOTE — TELEPHONE ENCOUNTER
Created telephone encounter. Spoke with Yobani Hernandez relayed message per NPBB regarding labs. Pt verbalized understanding.

## 2021-02-03 NOTE — TELEPHONE ENCOUNTER
Kidney function was normal 8/2020. Can add BMP on to labs from yesterday. Will need to call the lab.

## 2021-02-04 ENCOUNTER — TELEPHONE (OUTPATIENT)
Dept: CARDIOLOGY CLINIC | Age: 56
End: 2021-02-04

## 2021-02-08 LAB — INR BLD: 2.2

## 2021-02-09 ENCOUNTER — ANTI-COAG VISIT (OUTPATIENT)
Dept: CARDIOLOGY CLINIC | Age: 56
End: 2021-02-09
Payer: MEDICARE

## 2021-02-09 DIAGNOSIS — I48.21 PERMANENT ATRIAL FIBRILLATION (HCC): ICD-10-CM

## 2021-02-09 PROCEDURE — 93793 ANTICOAG MGMT PT WARFARIN: CPT | Performed by: NURSE PRACTITIONER

## 2021-02-22 ENCOUNTER — ANTI-COAG VISIT (OUTPATIENT)
Dept: CARDIOLOGY CLINIC | Age: 56
End: 2021-02-22
Payer: MEDICARE

## 2021-02-22 DIAGNOSIS — I48.21 PERMANENT ATRIAL FIBRILLATION (HCC): ICD-10-CM

## 2021-02-22 LAB — INR BLD: 2

## 2021-02-22 PROCEDURE — 93793 ANTICOAG MGMT PT WARFARIN: CPT | Performed by: NURSE PRACTITIONER

## 2021-02-26 DIAGNOSIS — I48.0 PAROXYSMAL ATRIAL FIBRILLATION (HCC): ICD-10-CM

## 2021-02-26 RX ORDER — DIGOXIN 125 MCG
125 TABLET ORAL DAILY
Qty: 90 TABLET | Refills: 1 | Status: SHIPPED | OUTPATIENT
Start: 2021-02-26

## 2021-03-01 LAB — INR BLD: 2.3

## 2021-03-04 ENCOUNTER — ANTI-COAG VISIT (OUTPATIENT)
Dept: CARDIOLOGY CLINIC | Age: 56
End: 2021-03-04
Payer: MEDICARE

## 2021-03-04 DIAGNOSIS — I48.21 PERMANENT ATRIAL FIBRILLATION (HCC): ICD-10-CM

## 2021-03-04 PROCEDURE — 93793 ANTICOAG MGMT PT WARFARIN: CPT | Performed by: NURSE PRACTITIONER

## 2021-03-15 LAB — INR BLD: 3.4

## 2021-03-17 ENCOUNTER — ANTI-COAG VISIT (OUTPATIENT)
Dept: CARDIOLOGY CLINIC | Age: 56
End: 2021-03-17

## 2021-03-22 LAB — INR BLD: 2.3

## 2021-03-23 ENCOUNTER — ANTI-COAG VISIT (OUTPATIENT)
Dept: CARDIOLOGY CLINIC | Age: 56
End: 2021-03-23
Payer: MEDICARE

## 2021-03-23 DIAGNOSIS — I48.21 PERMANENT ATRIAL FIBRILLATION (HCC): ICD-10-CM

## 2021-03-23 DIAGNOSIS — I47.1 ATRIAL TACHYCARDIA (HCC): ICD-10-CM

## 2021-03-23 PROCEDURE — 93793 ANTICOAG MGMT PT WARFARIN: CPT | Performed by: NURSE PRACTITIONER

## 2021-03-29 ENCOUNTER — TELEPHONE (OUTPATIENT)
Dept: CARDIOLOGY CLINIC | Age: 56
End: 2021-03-29

## 2021-03-29 ENCOUNTER — ANTI-COAG VISIT (OUTPATIENT)
Dept: CARDIOLOGY CLINIC | Age: 56
End: 2021-03-29
Payer: MEDICARE

## 2021-03-29 DIAGNOSIS — J44.9 CHRONIC OBSTRUCTIVE PULMONARY DISEASE, UNSPECIFIED COPD TYPE (HCC): ICD-10-CM

## 2021-03-29 LAB — INR BLD: 3.9

## 2021-03-29 PROCEDURE — 93793 ANTICOAG MGMT PT WARFARIN: CPT | Performed by: NURSE PRACTITIONER

## 2021-03-29 NOTE — TELEPHONE ENCOUNTER
LMOVM: INR is 3.9. Per protocol, dosing change is hold warfarin today then resume normal dosing. Recheck 1 week(s).  Provider notified. ~ Larisa Pa RN

## 2021-03-31 NOTE — TELEPHONE ENCOUNTER
Pt called and said to let [facility] and NPBB know that she did not skip dose of meds last night.   SARMAD

## 2021-04-07 LAB — INR BLD: 3.2

## 2021-04-07 PROCEDURE — 93793 ANTICOAG MGMT PT WARFARIN: CPT | Performed by: NURSE PRACTITIONER

## 2021-04-08 ENCOUNTER — ANTI-COAG VISIT (OUTPATIENT)
Dept: CARDIOLOGY CLINIC | Age: 56
End: 2021-04-08
Payer: MEDICARE

## 2021-04-08 DIAGNOSIS — I47.1 ATRIAL TACHYCARDIA (HCC): ICD-10-CM

## 2021-04-14 LAB — INR BLD: 5.5

## 2021-04-15 ENCOUNTER — TELEPHONE (OUTPATIENT)
Dept: CARDIOLOGY CLINIC | Age: 56
End: 2021-04-15

## 2021-04-15 ENCOUNTER — ANTI-COAG VISIT (OUTPATIENT)
Dept: CARDIOLOGY CLINIC | Age: 56
End: 2021-04-15
Payer: MEDICARE

## 2021-04-15 DIAGNOSIS — I48.21 PERMANENT ATRIAL FIBRILLATION (HCC): ICD-10-CM

## 2021-04-15 PROCEDURE — 93793 ANTICOAG MGMT PT WARFARIN: CPT | Performed by: NURSE PRACTITIONER

## 2021-05-03 LAB — INR BLD: 1.6

## 2021-05-04 ENCOUNTER — ANTI-COAG VISIT (OUTPATIENT)
Dept: CARDIOLOGY CLINIC | Age: 56
End: 2021-05-04

## 2021-05-04 ENCOUNTER — TELEPHONE (OUTPATIENT)
Dept: CARDIOLOGY CLINIC | Age: 56
End: 2021-05-04

## 2021-05-04 LAB — INR BLD: 1.6

## 2021-05-05 ENCOUNTER — ANTI-COAG VISIT (OUTPATIENT)
Dept: CARDIOLOGY CLINIC | Age: 56
End: 2021-05-05

## 2021-05-10 LAB — INR BLD: 3.4

## 2021-05-11 ENCOUNTER — ANTI-COAG VISIT (OUTPATIENT)
Dept: CARDIOLOGY CLINIC | Age: 56
End: 2021-05-11

## 2021-05-24 LAB — INR BLD: 2.4

## 2021-05-25 ENCOUNTER — ANTI-COAG VISIT (OUTPATIENT)
Dept: CARDIOLOGY CLINIC | Age: 56
End: 2021-05-25

## 2024-10-08 NOTE — TELEPHONE ENCOUNTER
How Severe Is Your Skin Lesion?: moderate Patient completed PET/CT 11/6/18. Patient seen in office by [facility] 11/8/19. Bronch was scheduled for 11/16/19 and completed. Patient no showed to appointment 1/14/19 with [facility]. Letter sent to patient on 1/18/19. Office spoke to patient on 1/22/19, see telephone encounter. Patient declined to schedule because of transportation issues. Patient called with message left for patient to call back to office. Please offer patient sooner appointment per request for testing. I will look for cancellations for follow up. Suggest Shanel Rowe for transportation issues. Is This A New Presentation, Or A Follow-Up?: Skin Lesions

## (undated) DEVICE — SYRINGE MED 50ML LUERSLIP TIP

## (undated) DEVICE — SINGLE USE BIOPSY VALVE MAJ-210: Brand: SINGLE USE BIOPSY VALVE (STERILE)

## (undated) DEVICE — SYRINGE MED 10ML SLIP TIP BLNT FILL AND LUERLOCK DISP

## (undated) DEVICE — AIRLIFE™ ADULT AEROSOL MASK VINYL, UNDER-THE-CHIN STYLE: Brand: AIRLIFE™

## (undated) DEVICE — SHEET,DRAPE,40X58,STERILE: Brand: MEDLINE

## (undated) DEVICE — SINGLE USE SUCTION VALVE MAJ-209: Brand: SINGLE USE SUCTION VALVE (STERILE)

## (undated) DEVICE — SPRAY SILKO 500 ML

## (undated) DEVICE — NEBULIZER AEROSOL TBNG 7 FT FOR ACUTE CARE NEBUTECH